# Patient Record
Sex: FEMALE | Race: WHITE | NOT HISPANIC OR LATINO | Employment: FULL TIME | URBAN - NONMETROPOLITAN AREA
[De-identification: names, ages, dates, MRNs, and addresses within clinical notes are randomized per-mention and may not be internally consistent; named-entity substitution may affect disease eponyms.]

---

## 2019-06-17 ENCOUNTER — OFFICE VISIT (OUTPATIENT)
Dept: PSYCHIATRY | Facility: CLINIC | Age: 44
End: 2019-06-17

## 2019-06-17 VITALS
BODY MASS INDEX: 42.88 KG/M2 | DIASTOLIC BLOOD PRESSURE: 80 MMHG | HEIGHT: 63 IN | HEART RATE: 78 BPM | WEIGHT: 242 LBS | OXYGEN SATURATION: 98 % | TEMPERATURE: 98.7 F | SYSTOLIC BLOOD PRESSURE: 122 MMHG

## 2019-06-17 DIAGNOSIS — F43.21 GRIEF: ICD-10-CM

## 2019-06-17 DIAGNOSIS — F33.3 MAJOR DEPRESSIVE DISORDER, RECURRENT EPISODE, SEVERE, WITH PSYCHOSIS (HCC): Primary | ICD-10-CM

## 2019-06-17 PROCEDURE — 90792 PSYCH DIAG EVAL W/MED SRVCS: CPT | Performed by: NURSE PRACTITIONER

## 2019-06-17 RX ORDER — BUPROPION HYDROCHLORIDE 75 MG/1
75 TABLET ORAL DAILY
Qty: 30 TABLET | Refills: 0 | Status: SHIPPED | OUTPATIENT
Start: 2019-06-17 | End: 2019-07-01 | Stop reason: SDUPTHER

## 2019-06-17 RX ORDER — PANTOPRAZOLE SODIUM 40 MG/1
40 TABLET, DELAYED RELEASE ORAL DAILY
Refills: 2 | COMMUNITY
Start: 2019-06-11 | End: 2020-09-23

## 2019-06-17 RX ORDER — BUSPIRONE HYDROCHLORIDE 15 MG/1
15 TABLET ORAL 2 TIMES DAILY
Refills: 2 | COMMUNITY
Start: 2019-06-11 | End: 2019-06-17 | Stop reason: SDUPTHER

## 2019-06-17 RX ORDER — PRAZOSIN HYDROCHLORIDE 1 MG/1
1 CAPSULE ORAL NIGHTLY
Qty: 30 CAPSULE | Refills: 0 | Status: SHIPPED | OUTPATIENT
Start: 2019-06-17 | End: 2019-07-15 | Stop reason: SDUPTHER

## 2019-06-17 RX ORDER — FLUOXETINE HYDROCHLORIDE 40 MG/1
CAPSULE ORAL
Refills: 2 | COMMUNITY
Start: 2019-06-11 | End: 2019-06-17 | Stop reason: SDUPTHER

## 2019-06-17 RX ORDER — VALACYCLOVIR HYDROCHLORIDE 500 MG/1
TABLET, FILM COATED ORAL
Refills: 3 | COMMUNITY
Start: 2019-03-22 | End: 2020-09-23 | Stop reason: SDUPTHER

## 2019-06-17 RX ORDER — FLUOXETINE HYDROCHLORIDE 40 MG/1
40 CAPSULE ORAL DAILY
Qty: 30 CAPSULE | Refills: 0 | Status: SHIPPED | OUTPATIENT
Start: 2019-06-17 | End: 2019-07-15 | Stop reason: SDUPTHER

## 2019-06-17 RX ORDER — HYDROCHLOROTHIAZIDE 25 MG/1
25 TABLET ORAL DAILY
Refills: 0 | COMMUNITY
Start: 2019-03-27 | End: 2020-09-23

## 2019-06-17 RX ORDER — IBUPROFEN 800 MG/1
800 TABLET ORAL 3 TIMES DAILY
Refills: 2 | COMMUNITY
Start: 2019-06-11 | End: 2021-01-20 | Stop reason: SDUPTHER

## 2019-06-17 RX ORDER — BUSPIRONE HYDROCHLORIDE 15 MG/1
15 TABLET ORAL 2 TIMES DAILY
Qty: 60 TABLET | Refills: 0 | Status: SHIPPED | OUTPATIENT
Start: 2019-06-17 | End: 2019-07-15

## 2019-06-17 NOTE — PROGRESS NOTES
"Subjective   Cynthia Kelsey is a 43 y.o. female who is here today for initial appointment to evaluate for medication options.     Chief Complaint: depression and anxiety    HPI:  History of Present Illness   Patient presents today with her  for initial evaluation for medication for depression and anxiety.  Patient states her depression started about 4 years ago when she was dealing with her son being an addict.  Patient states prior to the incident with her son she never had any problems with depression or anxiety.  Patient states her son has been 3 years clean now however 2 years ago she lost her mom to a drunk  and then a year later her brother passed away from alcoholism.  Patient states that she felt like she was being hit one thing right after the other and never got to fully grieve over any of it.  Patient states she is having a lot of depression and rates her depression at a 10 out of 10 on a 0-to-10 scale with 10 being the worst.  Patient states she do not want to leave the house, no energy, no motivation, emotionally eating, hopelessness, helplessness, and insomnia.  Patient states she is unable to sleep and is only getting maybe 2 to 3 hours asleep at night.  Patient states she is up every hour or so and tosses and turns all night long.  Patient states that she is dreaming a lot having multiple nightmares throughout the night then some nights just dreaming of \"stupid stuff \".  Patient states due to the stream she is unable to get any rest.  Patient states her anxiety is also high and rates it at a 9 out of 10 on a 0-to-10 scale with 10 being the worst.  Patient states she feels nervous, irritable, anxious, overwhelmed, and unable to get any rest.  Patient denies any panic attacks.  Patient states she is also been recently having hallucinations where she is seeing shadows in her room.  Patient states she never sees any distinct features however it is always just a shadow.  Patient states she " is also hearing things such as people saying her name.  Patient states that she will hear her name being called however there is no one around.  Patient states she feels like if she could get some sleep some of this would stop.  Patient states she has gained 40 pounds in the last year and is at her heaviest.  Patient states she used to love to exercise and workout but right now she has no energy.  Patient states she even has a gym in her home and is unable to make herself exercise.  Patient states she knows that if she began exercise and she would feel better however it is getting the motivation up to do it.  Patient states before everything happened she would routinely get up at 5 AM and go to the gym to work out before going to work however she has not been able to do this.  Patient states her appetite is increased and she feels like she could eat all the time.  Patient denies any marcos or hypomania.  Patient denies any flashbacks.  Patient adamantly denies any SI or HI.  Patient denies any self-harm.  Patient states that she does occasionally have the thoughts of what is the point.  Patient denies any side effects to current medication.  Patient states she has been on the medication for 2 to 3 months now.  Patient is currently taking Prozac 40 mg daily and BuSpar 15 mg twice a day.  Patient states she did notice a difference with Prozac when it was first started and notice a difference with the BuSpar as well.  Patient reports she is still currently off work however she is due to return next week and is unsure if she will be able to go in or not.  Patient states work is very stressful and even after she leaves the evening she still has to answer phone calls and emails.  Patient states she never feels like she gets a break from work.  Patient states even after the death of her mother and brother she only got a week off and do not feel like she got enough time to grieve.    Past Psych History:   Patient denies any  psychiatric admission.  Patient denies any suicide attempts in the past.  Patient denies any self-harm in the past.  Patient denies any abuse or trauma.  Patient denies any history of any mental illness prior to 4 years ago.    Previous Psych Meds:   Patient states the only medication she is ever tried is the Prozac and the BuSpar.  Patient states no side effects to either 1 of them.  Substance Abuse:    Patient states she was smoking cigarettes however she quit 3 months ago and will occasionally use the gum.  Patient states she has no desire craving at this time and does not use the gum often.  Patient denies any alcohol use current or in the past.  Patient denies any drug use current or in the past.  Patient states she will have caffeine throughout the day.  Patient states she drinks about 1 coffee a day and 1 pop a day.  Patient's  states that she does occasionally drink Thermovex for weight loss which also has caffeine in it.    Social History:   Patient states she grew up with her mother.  Patient states she did not know her father. Patient states he committed suicide when she was 9 months old.  Patient states she had one brother who passed away.  Patient states that she feels like she is an orphan as she has lost every member of her family.  Patient graduated high school and attended college in which she graduated with her bachelor's degree.  Patient is currently working at [employer] and has been for the past 5 years.  Patient states she is the  and her job is very stressful.  Patient states she comes home and still has to deal with emails and calls and does not feel like she gets to leave work.  Patient has been  to her  for 5 to 6 years and has a good relationship with him.  Patient states between the 2 of them they have 5 children.  Patient states 3 children are hers and 2 are he is.  Patient states good relationship with the children and her girls are actually always at her  side.  Patient states currently she has nothing that she enjoys her has time to do however she likes to float in her pool and exercise.  Patient states she would like to get to where she could enjoy this again.    Family Psychiatric History:  family history includes Alcohol abuse in her brother; Suicidality in her father.    Medical/Surgical History:  Past Medical History:   Diagnosis Date   • GERD (gastroesophageal reflux disease)      History reviewed. No pertinent surgical history.    No Known Allergies        Current Medications:   Current Outpatient Medications   Medication Sig Dispense Refill   • buPROPion (WELLBUTRIN) 75 MG tablet Take 1 tablet by mouth Daily. 30 tablet 0   • busPIRone (BUSPAR) 15 MG tablet Take 1 tablet by mouth 2 (Two) Times a Day. 60 tablet 0   • FLUoxetine (PROzac) 40 MG capsule Take 1 capsule by mouth Daily. 30 capsule 0   • hydrochlorothiazide (HYDRODIURIL) 25 MG tablet Take 25 mg by mouth Daily.  0   • ibuprofen (ADVIL,MOTRIN) 800 MG tablet Take 800 mg by mouth 3 (Three) Times a Day.  2   • pantoprazole (PROTONIX) 40 MG EC tablet Take 40 mg by mouth Daily.  2   • prazosin (MINIPRESS) 1 MG capsule Take 1 capsule by mouth Every Night. 30 capsule 0   • valACYclovir (VALTREX) 500 MG tablet   3     No current facility-administered medications for this visit.          Review of Systems   Constitutional: Positive for appetite change.   Respiratory: Negative.    Cardiovascular: Negative.    Psychiatric/Behavioral: Positive for dysphoric mood, hallucinations and sleep disturbance. The patient is nervous/anxious.     denies HEENT, cardiovascular, respiratory, liver, renal, GI/, endocrine, neuro, DERM, hematology, immunology, musculoskeletal disorders.    Objective   Physical Exam   Constitutional: Vital signs are normal. She appears well-developed and well-nourished. She is cooperative.   Neurological: She is alert.   Psychiatric: Her speech is normal. Judgment and thought content normal. Her  "mood appears anxious. She is agitated. Cognition and memory are normal. She exhibits a depressed mood.   Vitals reviewed.    Blood pressure 122/80, pulse 78, temperature 98.7 °F (37.1 °C), temperature source Temporal, height 160 cm (63\"), weight 110 kg (242 lb), SpO2 98 %. Body mass index is 42.87 kg/m².      Mental Status Exam:   Hygiene:   good  Cooperation:  Cooperative  Eye Contact:  Fair  Psychomotor Behavior:  Appropriate  Affect:  Appropriate  Hopelessness: 1  Speech:  Normal  Thought Process:  Goal directed and Linear  Thought Content:  Normal  Suicidal:  None  Homicidal:  None  Hallucinations:  Auditory and Visual   Delusion:  None  Memory:  Intact  Orientation:  Person, Place, Time and Situation  Reliability:  fair  Insight:  Fair  Judgement:  Fair  Impulse Control:  Fair  Physical/Medical Issues:  No       Short-term goals: Patient will be compliant with clinic appointments.  Patient will be engaged in therapy, medication compliant with minimal side effects. Patient  will report decrease of symptoms and frequency.    Long-term goals: Patient will have minimal symptoms of depression, anxiety, and insomnia with continued medication management. Patient will be compliant with treatment and appointments.       Problem list: anxiety, depression, and insomnia  Strengths:organization  Weaknesses: setting boundaries, tieing self worth to others    Assessment/Plan   Diagnoses and all orders for this visit:    Major depressive disorder, recurrent episode, severe, with psychosis (CMS/[facility])  -     busPIRone (BUSPAR) 15 MG tablet; Take 1 tablet by mouth 2 (Two) Times a Day.  -     FLUoxetine (PROzac) 40 MG capsule; Take 1 capsule by mouth Daily.  -     buPROPion (WELLBUTRIN) 75 MG tablet; Take 1 tablet by mouth Daily.    Grief  -     FLUoxetine (PROzac) 40 MG capsule; Take 1 capsule by mouth Daily.  -     buPROPion (WELLBUTRIN) 75 MG tablet; Take 1 tablet by mouth Daily.    Other orders  -     prazosin (MINIPRESS) 1 MG " capsule; Take 1 capsule by mouth Every Night.      Discussed medication options with patient.  Start prazosin 1 mg at bedtime and Wellbutrin 75 mg daily.  Continue BuSpar and Prozac.  Discussed with patient risks, benefits, and side effects of medications.  Patient acknowledged and verbally consented.  Discussed with patient healthy sleep hygiene and healthy diet.  Discussed with patient setting boundaries regarding when leaving work not answering emails or phone calls off duty.  Discussed with patient taking a 10 to 15 minutes a day for self-care and time for herself.  Discussed with patient if having any questions or concerns to call the office.  Discussed with patient during medication adjustments frequency of visits to achieve stabilization.  Discussed with patient if having any SI or HI to call 911 or go to the nearest ER.  Patient agreed to current treatment plan.  Follow up in two weeks.   Errors in dictation may reflect use of voice recognition software and not all errors in transcription may have been detected prior to signing.        This document has been electronically signed by FLAVIO Georges   June 18, 2019 11:29 AM

## 2019-07-01 ENCOUNTER — OFFICE VISIT (OUTPATIENT)
Dept: PSYCHIATRY | Facility: CLINIC | Age: 44
End: 2019-07-01

## 2019-07-01 VITALS
HEIGHT: 63 IN | SYSTOLIC BLOOD PRESSURE: 130 MMHG | DIASTOLIC BLOOD PRESSURE: 80 MMHG | WEIGHT: 246 LBS | OXYGEN SATURATION: 96 % | BODY MASS INDEX: 43.59 KG/M2 | TEMPERATURE: 98.3 F | HEART RATE: 84 BPM

## 2019-07-01 DIAGNOSIS — F33.3 MAJOR DEPRESSIVE DISORDER, RECURRENT EPISODE, SEVERE, WITH PSYCHOSIS (HCC): ICD-10-CM

## 2019-07-01 DIAGNOSIS — F43.21 GRIEF: ICD-10-CM

## 2019-07-01 DIAGNOSIS — F41.1 GENERALIZED ANXIETY DISORDER: Primary | ICD-10-CM

## 2019-07-01 PROCEDURE — 99213 OFFICE O/P EST LOW 20 MIN: CPT | Performed by: NURSE PRACTITIONER

## 2019-07-01 RX ORDER — CLONIDINE HYDROCHLORIDE 0.1 MG/1
0.1 TABLET ORAL NIGHTLY PRN
Qty: 14 TABLET | Refills: 14 | Status: SHIPPED | OUTPATIENT
Start: 2019-07-01 | End: 2019-07-15

## 2019-07-01 RX ORDER — FLUOXETINE HYDROCHLORIDE 20 MG/1
20 CAPSULE ORAL DAILY
Qty: 30 CAPSULE | Refills: 0 | Status: SHIPPED | OUTPATIENT
Start: 2019-07-01 | End: 2019-07-15 | Stop reason: SDUPTHER

## 2019-07-01 RX ORDER — BUPROPION HYDROCHLORIDE 75 MG/1
75 TABLET ORAL 2 TIMES DAILY
Qty: 60 TABLET | Refills: 0 | Status: SHIPPED | OUTPATIENT
Start: 2019-07-01 | End: 2019-07-15 | Stop reason: SDUPTHER

## 2019-07-01 NOTE — PROGRESS NOTES
Subjective   Cynthia Kelsey is a 43 y.o. female is here today for medication management follow-up.    Chief Complaint:  Depression, anxiety, and insomnia     History of Present Illness:   Patient presents today for follow-up for medication management with .  Patient states not a whole lot of difference but she has noticed a slight difference.  Patient states she is not having as much crying spells.  Patient states she still exhibits symptoms of depression of decreased energy, decreased motivation, insomnia, fatigue, and emotionally eating.  Patient states she is still wanting to stay home all the time.  Patient states not a huge difference in depression.  Patient states her dreams are not as vivid now since starting the prazosin.  Patient states she is actually able to sleep somewhat better.  Patient states she still has dreams and still able to remember some parts of them however she is not waking up in a panic.  Patient states she will go to sleep around 10 or 11 PM and does not get up until 6:30 AM however she will lay there at least 1 to 2 hours before being able to go to sleep.  Patient reports that her biggest trouble going to sleep is her anxiety when laying down.  Patient states she does go to her car during her lunch break at work and takes a 20 to 30-minute nap.  Patient states that this is the best sleep she gets.  Patient states she is having a hard time with getting in a car with someone.  Patient states she even does not trust her own driving.  Patient states getting in the car and trying to go somewhere will put her into a panic.  Patient denies any history of vehicle accidents but states her mother was killed by a drunk  and she is unsure if this is the reasoning behind the panic.  Patient denies any other panic attacks.  Patient states her anxiety is at the same level with no difference noted.  Patient states she still sees the shadows that are at the end of her bed or when she  "looks outside however now they are not talking to her anymore.  Patient denies any auditory hallucinations.  Patient denies any changes in her appetite.  Patient adamantly denies any SI or HI.  Patient states she has not had any SI this past week which is an improvement because she was having thoughts daily.  Patient states she has been able to make herself get out and go to her home gym or the pool occasionally through the week.  Patient states she does not stay there very long however this is an improvement from staying in the house.  Patient reports that she did return back to work and it is going the best it can.  Patient states that she has decided at the end of the year she will begin looking for different employment.  Patient denies any side effects to medications.  Patient denies any medical changes since last visit.    The following portions of the patient's history were reviewed and updated as appropriate: allergies, current medications, past family history, past medical history, past social history, past surgical history and problem list.    Review of Systems   Constitutional: Negative.    Respiratory: Negative.    Cardiovascular: Negative.    Psychiatric/Behavioral: Positive for dysphoric mood, hallucinations and sleep disturbance. The patient is nervous/anxious.        Objective   Physical Exam   Constitutional: Vital signs are normal. She appears well-developed and well-nourished. She is cooperative.   Neurological: She is alert.   Psychiatric: Her speech is normal and behavior is normal. Judgment and thought content normal. Her mood appears anxious. Cognition and memory are normal. She exhibits a depressed mood.   Vitals reviewed.    Blood pressure 130/80, pulse 84, temperature 98.3 °F (36.8 °C), temperature source Oral, height 160 cm (62.99\"), weight 112 kg (246 lb), SpO2 96 %. Body mass index is 43.59 kg/m².    No Known Allergies    Medication List:   Current Outpatient Medications   Medication Sig " Dispense Refill   • buPROPion (WELLBUTRIN) 75 MG tablet Take 1 tablet by mouth 2 (Two) Times a Day. 60 tablet 0   • busPIRone (BUSPAR) 15 MG tablet Take 1 tablet by mouth 2 (Two) Times a Day. 60 tablet 0   • FLUoxetine (PROzac) 40 MG capsule Take 1 capsule by mouth Daily. 30 capsule 0   • hydrochlorothiazide (HYDRODIURIL) 25 MG tablet Take 25 mg by mouth Daily.  0   • ibuprofen (ADVIL,MOTRIN) 800 MG tablet Take 800 mg by mouth 3 (Three) Times a Day.  2   • pantoprazole (PROTONIX) 40 MG EC tablet Take 40 mg by mouth Daily.  2   • prazosin (MINIPRESS) 1 MG capsule Take 1 capsule by mouth Every Night. 30 capsule 0   • valACYclovir (VALTREX) 500 MG tablet   3   • CloNIDine (CATAPRES) 0.1 MG tablet Take 1 tablet by mouth At Night As Needed (anxiety). 14 tablet 14   • FLUoxetine (PROZAC) 20 MG capsule Take 1 capsule by mouth Daily. Take with 40mg for a total dose 60mg 30 capsule 0     No current facility-administered medications for this visit.        Mental Status Exam:   Hygiene:   good  Cooperation:  Cooperative  Eye Contact:  Fair  Psychomotor Behavior:  Appropriate  Affect:  Appropriate  Hopelessness: Denies  Speech:  Normal  Thought Process:  Goal directed and Linear  Thought Content:  Normal  Suicidal:  None  Homicidal:  None  Hallucinations:  Visual  Delusion:  None  Memory:  Intact  Orientation:  Person, Place, Time and Situation  Reliability:  fair  Insight:  Fair  Judgement:  Fair  Impulse Control:  Fair  Physical/Medical Issues:  No         Assessment/Plan   Diagnoses and all orders for this visit:    Generalized anxiety disorder  -     CloNIDine (CATAPRES) 0.1 MG tablet; Take 1 tablet by mouth At Night As Needed (anxiety).  -     FLUoxetine (PROZAC) 20 MG capsule; Take 1 capsule by mouth Daily. Take with 40mg for a total dose 60mg    Major depressive disorder, recurrent episode, severe, with psychosis (CMS/HCC)  -     buPROPion (WELLBUTRIN) 75 MG tablet; Take 1 tablet by mouth 2 (Two) Times a Day.  -      FLUoxetine (PROZAC) 20 MG capsule; Take 1 capsule by mouth Daily. Take with 40mg for a total dose 60mg    Grief  -     buPROPion (WELLBUTRIN) 75 MG tablet; Take 1 tablet by mouth 2 (Two) Times a Day.        Discussed medication options with patient.  Start clonidine 0.1 at bedtime.  Increase Prozac and Wellbutrin.  Discussed with patient risk, benefits, and side effects of medications.  Patient and patient's  acknowledged and verbally consented.  Discussed with patient healthy sleep hygiene such as no screen time an hour before bed including TV, cell phone, and tablet.  Discussed with patient if unable to sleep in 10 to 15 minutes after laying down to get up and read or walk until feeling tired.  Discussed with patient healthy eating habits.  Discussed with patient if having any questions or concerns to call the office.  Discussed with patient having any SI or HI to call 911 or go to the nearest ER.  Patient and patient's  agreed to current treatment plan.    Follow up in two weeks.      Errors in dictation may reflect use of voice recognition software and not all errors in transcription may have been detected prior to signing.            This document has been electronically signed by FLAVIO Georges   July 1, 2019 2:47 PM

## 2019-07-15 ENCOUNTER — OFFICE VISIT (OUTPATIENT)
Dept: PSYCHIATRY | Facility: CLINIC | Age: 44
End: 2019-07-15

## 2019-07-15 VITALS
TEMPERATURE: 98.6 F | HEART RATE: 86 BPM | SYSTOLIC BLOOD PRESSURE: 122 MMHG | WEIGHT: 243 LBS | BODY MASS INDEX: 43.05 KG/M2 | OXYGEN SATURATION: 97 % | DIASTOLIC BLOOD PRESSURE: 82 MMHG | HEIGHT: 63 IN

## 2019-07-15 DIAGNOSIS — F43.21 GRIEF: ICD-10-CM

## 2019-07-15 DIAGNOSIS — F33.3 MAJOR DEPRESSIVE DISORDER, RECURRENT EPISODE, SEVERE, WITH PSYCHOSIS (HCC): ICD-10-CM

## 2019-07-15 DIAGNOSIS — F41.1 GENERALIZED ANXIETY DISORDER: ICD-10-CM

## 2019-07-15 PROCEDURE — 99213 OFFICE O/P EST LOW 20 MIN: CPT | Performed by: NURSE PRACTITIONER

## 2019-07-15 RX ORDER — FLUOXETINE HYDROCHLORIDE 40 MG/1
40 CAPSULE ORAL DAILY
Qty: 30 CAPSULE | Refills: 0 | Status: SHIPPED | OUTPATIENT
Start: 2019-07-15 | End: 2019-07-30 | Stop reason: SDUPTHER

## 2019-07-15 RX ORDER — PRAZOSIN HYDROCHLORIDE 1 MG/1
2 CAPSULE ORAL NIGHTLY
Qty: 60 CAPSULE | Refills: 0 | Status: SHIPPED | OUTPATIENT
Start: 2019-07-15 | End: 2019-07-19

## 2019-07-15 RX ORDER — BUPROPION HYDROCHLORIDE 100 MG/1
100 TABLET ORAL 2 TIMES DAILY
Qty: 60 TABLET | Refills: 0 | Status: SHIPPED | OUTPATIENT
Start: 2019-07-15 | End: 2019-07-30 | Stop reason: SDUPTHER

## 2019-07-15 RX ORDER — FLUOXETINE HYDROCHLORIDE 20 MG/1
20 CAPSULE ORAL DAILY
Qty: 30 CAPSULE | Refills: 0 | Status: SHIPPED | OUTPATIENT
Start: 2019-07-15 | End: 2019-07-30 | Stop reason: SDUPTHER

## 2019-07-15 NOTE — PROGRESS NOTES
Subjective   Cynthia Kelsey is a 43 y.o. female is here today for medication management follow-up.    Chief Complaint:  Depression and anxiety     History of Present Illness:    Patient presents today with  for a follow up for medication management for depression and anxiety.  Patient reports that she is doing somewhat better and has more energy.  Patient states that she feels like there is been a slight improvement with depression and rates it at a 8 on 0-10 scale with 10 being the worse.  Patient that she still has symptoms of depression of no energy, no motivation, slight fatigue, and sad mood.  Patient that she is no longer having the frequent suicidal thoughts.  Patient denies any SI or HI currently.  Patient reports that she is actually had a lot of social interaction this week and was able to be social without any panic or anxiety attacks.  Patient reports her anxiety is still there and rates it at a 6 or 7 on 0-to-10 scale with 10 being worse.  Patient denies any panic attacks.  Patient states she tried clonidine at night however it was ineffective so she stopped it and begin taking the prazosin again.  Patient reports with taking the prazosin she still has nightmares but are not as vivid as before and not waking her up as much through the night.  Patient reports getting about 6 hours asleep at night.  Patient states last week there was actually a couple days where she did not hide in her car and nap on her lunch break.  Patient adamantly denies any SI or HI.  Patient denies any auditory hallucinations but states she still seeing shadows occasionally throughout the day.  Patient denies any side effects to medications.  Patient denies any medical changes since last visit.  Patient denies any changes in appetite since last visit.    The following portions of the patient's history were reviewed and updated as appropriate: allergies, current medications, past family history, past medical history,  "past social history, past surgical history and problem list.    Review of Systems   Constitutional: Negative.    Respiratory: Negative.    Cardiovascular: Negative.    Psychiatric/Behavioral: Positive for dysphoric mood, hallucinations and sleep disturbance. The patient is nervous/anxious.        Objective   Physical Exam   Constitutional: Vital signs are normal. She appears well-developed and well-nourished. She is cooperative.   Neurological: She is alert.   Psychiatric: Her speech is normal. Judgment and thought content normal. She is agitated. Cognition and memory are normal. She exhibits a depressed mood.   Vitals reviewed.    Blood pressure 122/82, pulse 86, temperature 98.6 °F (37 °C), temperature source Oral, height 160 cm (62.99\"), weight 110 kg (243 lb), SpO2 97 %. Body mass index is 43.06 kg/m².    No Known Allergies    Medication List:   Current Outpatient Medications   Medication Sig Dispense Refill   • buPROPion (WELLBUTRIN) 100 MG tablet Take 1 tablet by mouth 2 (Two) Times a Day. 60 tablet 0   • FLUoxetine (PROZAC) 20 MG capsule Take 1 capsule by mouth Daily. Take with 40mg for a total dose 60mg 30 capsule 0   • FLUoxetine (PROzac) 40 MG capsule Take 1 capsule by mouth Daily. 30 capsule 0   • hydrochlorothiazide (HYDRODIURIL) 25 MG tablet Take 25 mg by mouth Daily.  0   • ibuprofen (ADVIL,MOTRIN) 800 MG tablet Take 800 mg by mouth 3 (Three) Times a Day.  2   • pantoprazole (PROTONIX) 40 MG EC tablet Take 40 mg by mouth Daily.  2   • prazosin (MINIPRESS) 1 MG capsule Take 2 capsules by mouth Every Night. 60 capsule 0   • valACYclovir (VALTREX) 500 MG tablet   3     No current facility-administered medications for this visit.        Mental Status Exam:   Hygiene:   good  Cooperation:  Cooperative  Eye Contact:  Fair  Psychomotor Behavior:  Appropriate  Affect:  Appropriate  Hopelessness: Denies  Speech:  Normal and Minimal  Thought Process:  Goal directed and Linear  Thought Content:  Normal  Suicidal: "  None  Homicidal:  None  Hallucinations:  Not demonstrated today   Delusion:  None  Memory:  Intact  Orientation:  Person, Place, Time and Situation  Reliability:  fair  Insight:  Fair  Judgement:  Fair  Impulse Control:  Fair  Physical/Medical Issues:  No           Assessment/Plan   Diagnoses and all orders for this visit:    Major depressive disorder, recurrent episode, severe, with psychosis (CMS/HCC)  -     buPROPion (WELLBUTRIN) 100 MG tablet; Take 1 tablet by mouth 2 (Two) Times a Day.  -     FLUoxetine (PROZAC) 20 MG capsule; Take 1 capsule by mouth Daily. Take with 40mg for a total dose 60mg  -     FLUoxetine (PROzac) 40 MG capsule; Take 1 capsule by mouth Daily.  -     prazosin (MINIPRESS) 1 MG capsule; Take 2 capsules by mouth Every Night.    Grief  -     buPROPion (WELLBUTRIN) 100 MG tablet; Take 1 tablet by mouth 2 (Two) Times a Day.  -     FLUoxetine (PROzac) 40 MG capsule; Take 1 capsule by mouth Daily.  -     prazosin (MINIPRESS) 1 MG capsule; Take 2 capsules by mouth Every Night.    Generalized anxiety disorder  -     FLUoxetine (PROZAC) 20 MG capsule; Take 1 capsule by mouth Daily. Take with 40mg for a total dose 60mg        Discussed medication options.  Increase Wellbutrin 200 mg and prazosin 2 mg.  Continue Prozac.  Reviewed the risks, benefits, and side effects of the medications; patient acknowledged and verbally consented.  Discussed with patient healthy sleep hygiene and healthy eating habits.  Discussed with patient weaning off medication when patient is ready and appropriate.  Patient reports she does want to become stable with her mood and anxiety but would like to come off of medication in the future.  Patient is agreeable to call the office if having any questions or concerns.  Patient is aware to call 911 or go to the nearest ER should begin having SI/HI.  Patient agreeable to current plan.    Follow up in four weeks.      Errors in dictation may reflect use of voice recognition  software and not all errors in transcription may have been detected prior to signing.            This document has been electronically signed by FLAVIO Georges   July 15, 2019 11:23 AM

## 2019-07-19 DIAGNOSIS — F43.21 GRIEF: ICD-10-CM

## 2019-07-19 DIAGNOSIS — F33.3 MAJOR DEPRESSIVE DISORDER, RECURRENT EPISODE, SEVERE, WITH PSYCHOSIS (HCC): Primary | ICD-10-CM

## 2019-07-19 DIAGNOSIS — F41.1 GENERALIZED ANXIETY DISORDER: ICD-10-CM

## 2019-07-19 RX ORDER — PRAZOSIN HYDROCHLORIDE 5 MG/1
5 CAPSULE ORAL NIGHTLY
Qty: 30 CAPSULE | Refills: 0 | Status: SHIPPED | OUTPATIENT
Start: 2019-07-19 | End: 2019-07-30 | Stop reason: SDUPTHER

## 2019-07-30 DIAGNOSIS — F41.1 GENERALIZED ANXIETY DISORDER: ICD-10-CM

## 2019-07-30 DIAGNOSIS — F43.21 GRIEF: ICD-10-CM

## 2019-07-30 DIAGNOSIS — F33.3 MAJOR DEPRESSIVE DISORDER, RECURRENT EPISODE, SEVERE, WITH PSYCHOSIS (HCC): ICD-10-CM

## 2019-07-30 RX ORDER — FLUOXETINE HYDROCHLORIDE 20 MG/1
20 CAPSULE ORAL DAILY
Qty: 30 CAPSULE | Refills: 0 | Status: SHIPPED | OUTPATIENT
Start: 2019-07-30 | End: 2019-08-18 | Stop reason: SDUPTHER

## 2019-07-30 RX ORDER — BUPROPION HYDROCHLORIDE 100 MG/1
100 TABLET ORAL 2 TIMES DAILY
Qty: 60 TABLET | Refills: 0 | Status: SHIPPED | OUTPATIENT
Start: 2019-07-30 | End: 2019-08-18 | Stop reason: SDUPTHER

## 2019-07-30 RX ORDER — PRAZOSIN HYDROCHLORIDE 5 MG/1
5 CAPSULE ORAL NIGHTLY
Qty: 30 CAPSULE | Refills: 0 | Status: SHIPPED | OUTPATIENT
Start: 2019-07-30 | End: 2019-08-20 | Stop reason: SDUPTHER

## 2019-07-30 RX ORDER — FLUOXETINE HYDROCHLORIDE 40 MG/1
40 CAPSULE ORAL DAILY
Qty: 30 CAPSULE | Refills: 0 | Status: SHIPPED | OUTPATIENT
Start: 2019-07-30 | End: 2019-08-18 | Stop reason: SDUPTHER

## 2019-08-18 DIAGNOSIS — F43.21 GRIEF: ICD-10-CM

## 2019-08-18 DIAGNOSIS — F33.3 MAJOR DEPRESSIVE DISORDER, RECURRENT EPISODE, SEVERE, WITH PSYCHOSIS (HCC): ICD-10-CM

## 2019-08-18 DIAGNOSIS — F41.1 GENERALIZED ANXIETY DISORDER: ICD-10-CM

## 2019-08-19 RX ORDER — FLUOXETINE HYDROCHLORIDE 20 MG/1
CAPSULE ORAL
Qty: 30 CAPSULE | Refills: 0 | Status: SHIPPED | OUTPATIENT
Start: 2019-08-19 | End: 2019-08-20 | Stop reason: DRUGHIGH

## 2019-08-19 RX ORDER — BUPROPION HYDROCHLORIDE 100 MG/1
TABLET ORAL
Qty: 60 TABLET | Refills: 0 | Status: SHIPPED | OUTPATIENT
Start: 2019-08-19 | End: 2019-08-20

## 2019-08-19 RX ORDER — FLUOXETINE HYDROCHLORIDE 40 MG/1
CAPSULE ORAL
Qty: 30 CAPSULE | Refills: 0 | Status: SHIPPED | OUTPATIENT
Start: 2019-08-19 | End: 2019-08-20 | Stop reason: SDUPTHER

## 2019-08-20 ENCOUNTER — OFFICE VISIT (OUTPATIENT)
Dept: PSYCHIATRY | Facility: CLINIC | Age: 44
End: 2019-08-20

## 2019-08-20 VITALS
HEIGHT: 63 IN | DIASTOLIC BLOOD PRESSURE: 78 MMHG | BODY MASS INDEX: 43.87 KG/M2 | HEART RATE: 74 BPM | WEIGHT: 247.6 LBS | SYSTOLIC BLOOD PRESSURE: 134 MMHG

## 2019-08-20 DIAGNOSIS — F43.21 GRIEF: ICD-10-CM

## 2019-08-20 DIAGNOSIS — F33.3 MAJOR DEPRESSIVE DISORDER, RECURRENT EPISODE, SEVERE, WITH PSYCHOSIS (HCC): Primary | ICD-10-CM

## 2019-08-20 DIAGNOSIS — F41.0 PANIC DISORDER WITHOUT AGORAPHOBIA: ICD-10-CM

## 2019-08-20 PROCEDURE — 99214 OFFICE O/P EST MOD 30 MIN: CPT | Performed by: NURSE PRACTITIONER

## 2019-08-20 RX ORDER — FLUOXETINE HYDROCHLORIDE 40 MG/1
80 CAPSULE ORAL DAILY
Qty: 60 CAPSULE | Refills: 0 | Status: SHIPPED | OUTPATIENT
Start: 2019-08-20 | End: 2019-08-27 | Stop reason: SDUPTHER

## 2019-08-20 RX ORDER — PRAZOSIN HYDROCHLORIDE 5 MG/1
5 CAPSULE ORAL NIGHTLY
Qty: 30 CAPSULE | Refills: 0 | Status: SHIPPED | OUTPATIENT
Start: 2019-08-20 | End: 2019-08-27 | Stop reason: SDUPTHER

## 2019-08-20 RX ORDER — DIAZEPAM 2 MG/1
2 TABLET ORAL DAILY PRN
Qty: 10 TABLET | Refills: 0 | Status: SHIPPED | OUTPATIENT
Start: 2019-08-20 | End: 2019-09-20 | Stop reason: SDUPTHER

## 2019-08-20 RX ORDER — ARIPIPRAZOLE 2 MG/1
2 TABLET ORAL DAILY
Qty: 30 TABLET | Refills: 0 | Status: SHIPPED | OUTPATIENT
Start: 2019-08-20 | End: 2019-08-27 | Stop reason: SDUPTHER

## 2019-08-20 NOTE — PROGRESS NOTES
"      Subjective   Cynthia Kelsey is a 43 y.o. female is here today for medication management follow-up.    Chief Complaint:  Depression and anxiety     History of Present Illness:    Patient presents today with  for follow-up for medication management.  Patient states she has been doing \"okay\".  Patient states that she has not had any BuSpar since last visit and has been able to successfully wean off.  Patient reports she has not been at her own building at work in the past 3 weeks.  Patient states she was on vacation 1 week and went to the [facility].  Patient states she went with her girls and her  and they had a great time.  Patient states the other times she has been traveling to other offices so it has been sort of a mini break from her current office.  Has been reports there was an incident a couple of weeks ago in which the patient found out situation regarding her stepfather and her brother's fiancé.   reports since the passing of the patient's mother her stepfather and her brother's fiancé are now in a relationship and living in her mother's old home.   reports nobody had told the patient due to her current mental state.  Patient states she went to visit her younger brothers and her stepdad and found out the situation.  Patient states she got very upset and was very angry.  Patient states \"she wanted to hurt them\".  Patient states she did go home and got her 's pistol and was going to their house however when she went outside in the rain she stopped and did not go.  Patient states she has not had any feelings or desire to hurt him now.  Patient states finding this out was \"a slap in the face and betrayal of her mother \".   states he found out about it when he got home and immediately locked up guns and ammo as well as medication.   states there is only one key to the gun safe and he has it on him at all times.  Patient states now she is trying to avoid them " "and does not want to talk to her tarad or her brother's fiancé.  Patient reports telma has tried to contact her multiple times through phone call and facebook messages however she does ignore them.  Patient states her depression is at a 9 on a 0-to-10 scale with 10 being the worst.  Patient states she feels like she has no control over her life.  Patient states she is having difficulty because her family believes she should just \"get over it \".  Patient still has complaints of depression of depressed mood, no energy, no motivation, and irritability.  Patient states her anxiety has been a 9 on a 0-to-10 scale with 10 being the worst.  Patient complains of anxiety of feeling overwhelmed and on edge.  Patient reports having 3-4 panic attacks a month.  Patient states these last a few minutes.  Patient states she has not seen any of the shadows recently but is still hearing the noises.  Patient denies any command hallucinations.  Patient denies any visual hallucinations.  Patient states she is only getting about an hour of sleep at night.  Patient states she still having dreams however they are duller and not as severe as before medication.  Patient reports some of the dreams are good dreams.  Patient adamantly denies any SI or HI.  Discussed going to the hospital with patient.  Patient states she does not feel like she needs to go the hospital.  Patient adamantly denies any thoughts, plan, or intent to harm anyone else.  Patient states she only had the thought to hurt her tarad and the fiance the one time.  Patient states she is still taking her medication.  Patient denies any side effects to medication.  The following portions of the patient's history were reviewed and updated as appropriate: allergies, current medications, past family history, past medical history, past social history, past surgical history and problem list.    Review of Systems   Constitutional: Negative.    Respiratory: Negative.  " "  Cardiovascular: Negative.    Psychiatric/Behavioral: Positive for agitation, dysphoric mood, hallucinations and sleep disturbance. The patient is nervous/anxious.        Objective   Physical Exam   Constitutional: Vital signs are normal. She appears well-developed and well-nourished. She is cooperative.   Neurological: She is alert.   Psychiatric: Her speech is normal. Judgment and thought content normal. Her mood appears anxious. Her affect is angry. She is agitated. Cognition and memory are normal.   Vitals reviewed.    Blood pressure 134/78, pulse 74, height 160 cm (62.99\"), weight 112 kg (247 lb 9.6 oz). Body mass index is 43.87 kg/m².    No Known Allergies    Medication List:   Current Outpatient Medications   Medication Sig Dispense Refill   • ARIPiprazole (ABILIFY) 2 MG tablet Take 1 tablet by mouth Daily. 30 tablet 0   • diazePAM (VALIUM) 2 MG tablet Take 1 tablet by mouth Daily As Needed for Anxiety. 10 tablet 0   • FLUoxetine (PROzac) 40 MG capsule Take 2 capsules by mouth Daily. 60 capsule 0   • hydrochlorothiazide (HYDRODIURIL) 25 MG tablet Take 25 mg by mouth Daily.  0   • ibuprofen (ADVIL,MOTRIN) 800 MG tablet Take 800 mg by mouth 3 (Three) Times a Day.  2   • pantoprazole (PROTONIX) 40 MG EC tablet Take 40 mg by mouth Daily.  2   • prazosin (MINIPRESS) 5 MG capsule Take 1 capsule by mouth Every Night. 30 capsule 0   • valACYclovir (VALTREX) 500 MG tablet   3     No current facility-administered medications for this visit.        Mental Status Exam:   Hygiene:   good  Cooperation:  Cooperative  Eye Contact:  Fair  Psychomotor Behavior:  Aggitated  Affect:  Appropriate  Hopelessness: Denies  Speech:  Normal  Thought Process:  Goal directed and Linear  Thought Content:  Mood congurent  Suicidal:  None  Homicidal:  None  Hallucinations:  Not demonstrated today   Delusion:  None  Memory:  Intact  Orientation:  Person, Place, Time and Situation  Reliability:  fair  Insight:  Fair  Judgement:  " Fair  Impulse Control:  Fair  Physical/Medical Issues:  No                Assessment/Plan   Diagnoses and all orders for this visit:    Major depressive disorder, recurrent episode, severe, with psychosis (CMS/[facility])  -     FLUoxetine (PROzac) 40 MG capsule; Take 2 capsules by mouth Daily.  -     prazosin (MINIPRESS) 5 MG capsule; Take 1 capsule by mouth Every Night.  -     ARIPiprazole (ABILIFY) 2 MG tablet; Take 1 tablet by mouth Daily.    Grief  -     FLUoxetine (PROzac) 40 MG capsule; Take 2 capsules by mouth Daily.  -     prazosin (MINIPRESS) 5 MG capsule; Take 1 capsule by mouth Every Night.  -     ARIPiprazole (ABILIFY) 2 MG tablet; Take 1 tablet by mouth Daily.    Other orders  -     diazePAM (VALIUM) 2 MG tablet; Take 1 tablet by mouth Daily As Needed for Anxiety.        Discussed medication options with patient.  Increase Prozac to 80 mg.  Decrease Wellbutrin to once a day for 1 week then discontinue.  Start Abilify 2 mg.  Start Valium 2 mg 1 daily as needed.  Continue prazosin 5 mg.  Discussed with patient and  risk, benefits, and side effects of medication.  Patient and   acknowledged and verbally consent.  Connor report reviewed. Patient is being prescribed a controlled substance as part of treatment plan. Patient has been educated of appropriate use of the medications, including risk of somnolence, limited ability to drive and/or work safely, and potential for dependence, respiratory depression and overdose. Patient is also informed that the medication are to be used by the patient only- avoid any combined use of ETOH or other substances unless prescribed.  Discussed with  [facility]   states he is locked up all firearms and ammo and there is only one key to the safe in which he keeps on him at all times.    states he is also locking up her medication as well in the "[employer] safe and gives her the medication when needed.  Discussed  with patient and  if having any questions, concerns, or worsening of symptoms to call the office.  Discussed with patient if having any SI or HI to call 911 or go to the nearest ER.  Discussed with patient going into [facility] for admission or another hospital.  Patient states she does not want to go into the hospital.    Discussed with Vania  of behavioral health clinic regarding situation and risk. Advised to follow up weekly with patient to monitor during medication adjustment. Discussed with Patient and  if at any time, she begins having any HI to go to the nearest ER or call 911. Patient and  acknowledged and agreed.        Follow up in one week.     Errors in dictation may reflect use of voice recognition software and not all errors in transcription may have been detected prior to signing.              This document has been electronically signed by FLAVIO Georges   August 21, 2019 9:30 AM

## 2019-08-27 ENCOUNTER — OFFICE VISIT (OUTPATIENT)
Dept: PSYCHIATRY | Facility: CLINIC | Age: 44
End: 2019-08-27

## 2019-08-27 VITALS
BODY MASS INDEX: 43.59 KG/M2 | SYSTOLIC BLOOD PRESSURE: 138 MMHG | DIASTOLIC BLOOD PRESSURE: 70 MMHG | WEIGHT: 246 LBS | HEART RATE: 78 BPM

## 2019-08-27 DIAGNOSIS — F33.3 MAJOR DEPRESSIVE DISORDER, RECURRENT EPISODE, SEVERE, WITH PSYCHOSIS (HCC): ICD-10-CM

## 2019-08-27 DIAGNOSIS — F43.21 GRIEF: ICD-10-CM

## 2019-08-27 PROCEDURE — 99213 OFFICE O/P EST LOW 20 MIN: CPT | Performed by: NURSE PRACTITIONER

## 2019-08-27 RX ORDER — ARIPIPRAZOLE 2 MG/1
2 TABLET ORAL DAILY
Qty: 30 TABLET | Refills: 0
Start: 2019-08-27 | End: 2019-09-06 | Stop reason: SDUPTHER

## 2019-08-27 RX ORDER — PRAZOSIN HYDROCHLORIDE 5 MG/1
5 CAPSULE ORAL NIGHTLY
Qty: 30 CAPSULE | Refills: 0 | Status: SHIPPED | OUTPATIENT
Start: 2019-08-27 | End: 2019-09-20 | Stop reason: SINTOL

## 2019-08-27 RX ORDER — FLUOXETINE HYDROCHLORIDE 40 MG/1
80 CAPSULE ORAL DAILY
Qty: 60 CAPSULE | Refills: 0
Start: 2019-08-27 | End: 2019-09-20 | Stop reason: SDUPTHER

## 2019-08-27 NOTE — TREATMENT PLAN
Multi-Disciplinary Problems (from Behavioral Health Treatment Plan)    Active Problems     Problem: Assessment/EAP  Start Date: 08/27/19    Problem Details:  The patient self-scales this problem as a 9 with 10 being the worst.       Goal Priority Start Date Expected End Date End Date    Patient will utilize appropriate treatment services. -- 08/27/19 -- --    Goal Details:  Progress toward goal:  Not appropriate to rate progress toward goal since this is the initial treatment plan.         Goal Intervention Frequency Start Date End Date    Assist patient in developing a plan of action Weekly 08/27/19 --    Intervention Details:  Duration of treatment until until remission of symptoms.             Problem: Anxiety  Start Date: 08/27/19    Problem Details:  The patient self-scales this problem as a 9 with 10 being the worst.       Goal Priority Start Date Expected End Date End Date    Patient will develop and implement behavioral and cognitive strategies to reduce anxiety and irrational fears. -- 08/27/19 -- --    Goal Details:  Progress toward goal:  Not appropriate to rate progress toward goal since this is the initial treatment plan.       Goal Intervention Frequency Start Date End Date    Help patient explore past emotional issues in relation to present anxiety. Weekly 08/27/19 --    Intervention Details:  Duration of treatment until until remission of symptoms.       Goal Intervention Frequency Start Date End Date    Help patient develop an awareness of their cognitive and physical responses to anxiety. Weekly 08/27/19 --    Intervention Details:  Duration of treatment until until remission of symptoms.             Problem: Depression  Start Date: 08/27/19    Problem Details:  The patient self-scales this problem as a 9 with 10 being the worst.       Goal Priority Start Date Expected End Date End Date    Patient will demonstrate the ability to initiate new constructive life skills outside of sessions on a  consistent basis. -- 08/27/19 -- --    Goal Details:  Progress toward goal:  Not appropriate to rate progress toward goal since this is the initial treatment plan.       Goal Intervention Frequency Start Date End Date    Assist patient in setting attainable activities of daily living goals. PRN 08/27/19 --    Goal Intervention Frequency Start Date End Date    Provide education about depression Weekly 08/27/19 --    Intervention Details:  Duration of treatment until until remission of symptoms.       Goal Intervention Frequency Start Date End Date    Assist patient in developing healthy coping strategies. Weekly 08/27/19 --    Intervention Details:  Duration of treatment until until remission of symptoms.                          I have discussed and reviewed this treatment plan with the patient.  It has been printed for signatures.

## 2019-08-27 NOTE — PROGRESS NOTES
"      Subjective   Cynthia Kelsey is a 43 y.o. female is here today for medication management follow-up.    Chief Complaint:  Depression and anxiety     History of Present Illness:    Patient presents today with  for follow-up for medication management.  Patient states she is doing a lot better this week and although her anxiety is still very high she does feel some better.  Patient states she did begin taking the Abilify and has not noticed any big side effects.  Patient states she has noticed some nausea at certain times during the day however it goes away after few minutes.  Patient states she has noticed since started taking the Abilify that she is able to go to sleep easier.  Patient states when she lays down she is able to go straight to sleep now.  Patient states she is getting a few hours of sleep now.  Patient states she still having dreams frequently however they are not as vivid and she is not waking up in the middle of the night in a panic.  Patient reports she did begin exercising with her daughter again and has lost 1 pound so far.  Patient states this has made her feel somewhat better.  Patient states her anxiety is still high and rates it about the same as last visit.  Patient states her depression is about the same as it was last visit.  Patient states she still has the same feelings of no energy, no motivation, and not wanting to get up and do anything.  Patient states however she is fighting through it.  Patient states no changes with appetite.  Patient states she still having the hallucinations however not seeing things mainly just hearing and feeling something there.  Patient states she will have the feeling that something is there and she can hear it say her name however it never says anything else.  Patient reports only having one \"crazy spell \"this past week in which she was just arguing and fighting with her daughter over nonsense stuff.  Patient states she was able to come off of " the Wellbutrin and has had no side effects from that either.  Patient adamantly denies any SI or HI.  Patient denies any medical changes since last visit.   reports he did put the Valium in the gun safe and he has the camara.   reports if patient needs the medication he will give it to her.  Patient states she has not felt like she has needed it any this week and has not taken any.   The following portions of the patient's history were reviewed and updated as appropriate: allergies, current medications, past family history, past medical history, past social history, past surgical history and problem list.    Review of Systems   Constitutional: Negative.    Respiratory: Negative.    Cardiovascular: Negative.    Gastrointestinal: Positive for nausea.   Psychiatric/Behavioral: Positive for agitation, dysphoric mood, hallucinations and sleep disturbance. The patient is nervous/anxious.        Objective   Physical Exam   Constitutional: Vital signs are normal. She appears well-developed and well-nourished. She is cooperative.   Neurological: She is alert.   Psychiatric: Her speech is normal and behavior is normal. Judgment and thought content normal. Her mood appears anxious. Cognition and memory are normal. She exhibits a depressed mood.   Vitals reviewed.    Blood pressure 138/70, pulse 78, weight 112 kg (246 lb). Body mass index is 43.59 kg/m².    No Known Allergies    Medication List:   Current Outpatient Medications   Medication Sig Dispense Refill   • ARIPiprazole (ABILIFY) 2 MG tablet Take 1 tablet by mouth Daily. 30 tablet 0   • diazePAM (VALIUM) 2 MG tablet Take 1 tablet by mouth Daily As Needed for Anxiety. 10 tablet 0   • FLUoxetine (PROzac) 40 MG capsule Take 2 capsules by mouth Daily. 60 capsule 0   • hydrochlorothiazide (HYDRODIURIL) 25 MG tablet Take 25 mg by mouth Daily.  0   • ibuprofen (ADVIL,MOTRIN) 800 MG tablet Take 800 mg by mouth 3 (Three) Times a Day.  2   • pantoprazole (PROTONIX) 40  MG EC tablet Take 40 mg by mouth Daily.  2   • prazosin (MINIPRESS) 5 MG capsule Take 1 capsule by mouth Every Night. 30 capsule 0   • valACYclovir (VALTREX) 500 MG tablet   3     No current facility-administered medications for this visit.        Mental Status Exam:   Hygiene:   good  Cooperation:  Cooperative  Eye Contact:  Good  Psychomotor Behavior:  Appropriate  Affect:  Appropriate  Hopelessness: Denies  Speech:  Normal  Thought Process:  Goal directed and Linear  Thought Content:  Normal  Suicidal:  None  Homicidal:  None  Hallucinations:  None  Delusion:  None  Memory:  Intact  Orientation:  Person, Place, Time and Situation  Reliability:  fair  Insight:  Fair  Judgement:  Fair  Impulse Control:  Fair  Physical/Medical Issues:  No            Assessment/Plan   Diagnoses and all orders for this visit:    Major depressive disorder, recurrent episode, severe, with psychosis (CMS/HCC)  -     ARIPiprazole (ABILIFY) 2 MG tablet; Take 1 tablet by mouth Daily.  -     FLUoxetine (PROzac) 40 MG capsule; Take 2 capsules by mouth Daily.  -     prazosin (MINIPRESS) 5 MG capsule; Take 1 capsule by mouth Every Night.    Grief  -     ARIPiprazole (ABILIFY) 2 MG tablet; Take 1 tablet by mouth Daily.  -     FLUoxetine (PROzac) 40 MG capsule; Take 2 capsules by mouth Daily.  -     prazosin (MINIPRESS) 5 MG capsule; Take 1 capsule by mouth Every Night.        Discussed medication options.  Continue Abilify, Prozac, and prazosin.  Discussed with patient risk, benefits, and side effects medications.  Patient acknowledged and verbally consented.  Discussed with patient healthy sleep hygiene and setting up at nighttime routine.  Discussed with patient if having any questions, concerns, or worsening of symptoms to call the office.  Discussed with  continuing to keep all firearms as well as medications and gun safe.  Discussed with patient if having any SI or HI to call 911 or go to the nearest ER.  Patient agreeable to current  plan.         Follow up in one week.     Errors in dictation may reflect use of voice recognition software and not all errors in transcription may have been detected prior to signing.            This document has been electronically signed by FLAVIO Georges   August 27, 2019 8:52 AM

## 2019-09-06 DIAGNOSIS — F33.3 MAJOR DEPRESSIVE DISORDER, RECURRENT EPISODE, SEVERE, WITH PSYCHOSIS (HCC): ICD-10-CM

## 2019-09-06 DIAGNOSIS — F43.21 GRIEF: ICD-10-CM

## 2019-09-06 RX ORDER — ARIPIPRAZOLE 5 MG/1
5 TABLET ORAL DAILY
Qty: 30 TABLET | Refills: 0 | Status: SHIPPED | OUTPATIENT
Start: 2019-09-06 | End: 2019-09-20 | Stop reason: SDUPTHER

## 2019-09-20 ENCOUNTER — OFFICE VISIT (OUTPATIENT)
Dept: PSYCHIATRY | Facility: CLINIC | Age: 44
End: 2019-09-20

## 2019-09-20 VITALS
DIASTOLIC BLOOD PRESSURE: 82 MMHG | WEIGHT: 252.4 LBS | SYSTOLIC BLOOD PRESSURE: 138 MMHG | HEART RATE: 87 BPM | BODY MASS INDEX: 44.72 KG/M2

## 2019-09-20 DIAGNOSIS — F33.3 MAJOR DEPRESSIVE DISORDER, RECURRENT EPISODE, SEVERE, WITH PSYCHOSIS (HCC): ICD-10-CM

## 2019-09-20 DIAGNOSIS — F43.21 GRIEF: ICD-10-CM

## 2019-09-20 PROCEDURE — 99213 OFFICE O/P EST LOW 20 MIN: CPT | Performed by: NURSE PRACTITIONER

## 2019-09-20 RX ORDER — ARIPIPRAZOLE 10 MG/1
10 TABLET ORAL DAILY
Qty: 30 TABLET | Refills: 0 | Status: SHIPPED | OUTPATIENT
Start: 2019-09-20 | End: 2019-10-29 | Stop reason: SDUPTHER

## 2019-09-20 RX ORDER — INFLUENZA A VIRUS A/MICHIGAN/45/2015 X-275 (H1N1) ANTIGEN (FORMALDEHYDE INACTIVATED), INFLUENZA A VIRUS A/SINGAPORE/INFIMH-16-0019/2016 IVR-186 (H3N2) ANTIGEN (FORMALDEHYDE INACTIVATED), INFLUENZA B VIRUS B/PHUKET/3073/2013 ANTIGEN (FORMALDEHYDE INACTIVATED), AND INFLUENZA B VIRUS B/MARYLAND/15/2016 BX-69A ANTIGEN (FORMALDEHYDE INACTIVATED) 15; 15; 15; 15 UG/.5ML; UG/.5ML; UG/.5ML; UG/.5ML
INJECTION, SUSPENSION INTRAMUSCULAR
Refills: 0 | COMMUNITY
Start: 2019-09-07 | End: 2020-09-23

## 2019-09-20 RX ORDER — HEPATITIS A AND HEPATITIS B (RECOMBINANT) VACCINE 720; 20 [IU]/ML; UG/ML
INJECTION, SUSPENSION INTRAMUSCULAR
Refills: 0 | COMMUNITY
Start: 2019-09-07 | End: 2020-09-23

## 2019-09-20 RX ORDER — DIAZEPAM 2 MG/1
2 TABLET ORAL DAILY PRN
Qty: 20 TABLET | Refills: 0 | Status: SHIPPED | OUTPATIENT
Start: 2019-09-20 | End: 2020-05-06 | Stop reason: SDDI

## 2019-09-20 RX ORDER — FLUOXETINE HYDROCHLORIDE 40 MG/1
80 CAPSULE ORAL DAILY
Qty: 60 CAPSULE | Refills: 0 | Status: SHIPPED | OUTPATIENT
Start: 2019-09-20 | End: 2019-10-29 | Stop reason: SDUPTHER

## 2019-09-20 NOTE — PROGRESS NOTES
Subjective   Cynthia Kelsey is a 43 y.o. female is here today for medication management follow-up.    Chief Complaint:  Depression, anxiety     History of Present Illness:   Patient presents today with  for follow-up for medication management.  Patient states she has been doing much better and has noticed a difference with the addition of the Abilify.  Patient states she can feel it starting to work however she is not where she wants to be.  Patient states she still has issues with having no patience when dealing with things.  Patient states she noticed a decrease in her depression however still rates it at a 5 on a 0-to-10 scale with 10 being the worst.  Patient states she still having symptoms of depression of crying, decreased energy, and irritation/agitation.  Patient states she has been feeling better though from what she was previously.  Patient reports she has been going to the gym for the past couple weeks with her daughter and has been doing daily classes.  Patient states this has helped with her mood.  Patient states she is still having increased anxiety and rates her anxiety at a 5 on a 0-to-10 scale with 10 being the worst.  Patient states still feeling overwhelmed as well as agitated.  Patient denies any panic attacks.  Patient states she is sleeping better and has no difficulty going to sleep now.  Patient states she is getting at least 6 hours of sleep at night.  Patient states she still having nightmares however they are not every night or as vivid as they were.  Patient states she has not been taking the prazosin due to make her feel very groggy the next morning.  Patient states her appetite is good and eating at least 3 meals per day.  Patient denies any visual hallucinations.  Patient states she still been hearing the voices of people talking.  Patient denies any command hallucinations.  Patient adamantly denies any SI or HI.  Denies any side effects to medications.  Patient denies  any medical changes since last visit.  The following portions of the patient's history were reviewed and updated as appropriate: allergies, current medications, past family history, past medical history, past social history, past surgical history and problem list.    Review of Systems   Constitutional: Positive for activity change.   Respiratory: Negative.    Cardiovascular: Negative.    Psychiatric/Behavioral: Positive for agitation, dysphoric mood, hallucinations and sleep disturbance. The patient is nervous/anxious.        Objective   Physical Exam   Constitutional: Vital signs are normal. She appears well-developed and well-nourished. She is cooperative.   Neurological: She is alert.   Psychiatric: Her speech is normal. Judgment and thought content normal. She is agitated. Cognition and memory are normal. She exhibits a depressed mood.   Vitals reviewed.    Blood pressure 138/82, pulse 87, weight 114 kg (252 lb 6.4 oz). Body mass index is 44.72 kg/m².    No Known Allergies    Medication List:   Current Outpatient Medications   Medication Sig Dispense Refill   • ARIPiprazole (ABILIFY) 5 MG tablet Take 1 tablet by mouth Daily. 30 tablet 0   • diazePAM (VALIUM) 2 MG tablet Take 1 tablet by mouth Daily As Needed for Anxiety. 10 tablet 0   • FLUoxetine (PROzac) 40 MG capsule Take 2 capsules by mouth Daily. 60 capsule 0   • hydrochlorothiazide (HYDRODIURIL) 25 MG tablet Take 25 mg by mouth Daily.  0   • ibuprofen (ADVIL,MOTRIN) 800 MG tablet Take 800 mg by mouth 3 (Three) Times a Day.  2   • pantoprazole (PROTONIX) 40 MG EC tablet Take 40 mg by mouth Daily.  2   • prazosin (MINIPRESS) 5 MG capsule Take 1 capsule by mouth Every Night. 30 capsule 0   • valACYclovir (VALTREX) 500 MG tablet   3     No current facility-administered medications for this visit.        Mental Status Exam:   Hygiene:   good  Cooperation:  Cooperative  Eye Contact:  Good  Psychomotor Behavior:  Appropriate  Affect:  Appropriate  Hopelessness:  Denies  Speech:  Normal  Thought Process:  Goal directed and Linear  Thought Content:  Normal  Suicidal:  None  Homicidal:  None  Hallucinations:  None  Delusion:  None  Memory:  Intact  Orientation:  Person, Place, Time and Situation  Reliability:  fair  Insight:  Fair  Judgement:  Fair  Impulse Control:  Fair  Physical/Medical Issues:  No                Assessment/Plan   Diagnoses and all orders for this visit:    Major depressive disorder, recurrent episode, severe, with psychosis (CMS/HCC)  -     ARIPiprazole (ABILIFY) 10 MG tablet; Take 1 tablet by mouth Daily.  -     diazePAM (VALIUM) 2 MG tablet; Take 1 tablet by mouth Daily As Needed for Anxiety.  -     FLUoxetine (PROzac) 40 MG capsule; Take 2 capsules by mouth Daily.    Grief  -     ARIPiprazole (ABILIFY) 10 MG tablet; Take 1 tablet by mouth Daily.  -     FLUoxetine (PROzac) 40 MG capsule; Take 2 capsules by mouth Daily.            Discussed medication options with patient and . Reviewed the risks, benefits, and side effects of the medications; patient acknowledged and verbally consented. Patient was instructed on medication side effects, benefits, and also of no treatment.  Patient was given an explanation regarding potential for increased risk of diabetes, lipids, and weight gain.  Labs will be assessed as clinically indicated.  Diet was discussed especially healthy diet choices and increasing activity and exercise.  Patient was strongly urged to continue weight maintance or weight loss efforts.  Patient reported verbalized understanding of instructions. Patient is agreeable to call the office with any questions, concerns, or worsening of symptoms.  Patient is aware to call 911 or go to the nearest ER should begin having SI/HI.   states he has still been locking up all firearms as well as medications.       Follow up in four weeks.        Errors in dictation may reflect use of voice recognition software and not all errors in transcription  may have been detected prior to signing.                This document has been electronically signed by FLAVIO Georges   September 20, 2019 8:49 AM

## 2019-10-29 DIAGNOSIS — F33.3 MAJOR DEPRESSIVE DISORDER, RECURRENT EPISODE, SEVERE, WITH PSYCHOSIS (HCC): ICD-10-CM

## 2019-10-29 DIAGNOSIS — F43.21 GRIEF: ICD-10-CM

## 2019-10-30 RX ORDER — FLUOXETINE HYDROCHLORIDE 40 MG/1
80 CAPSULE ORAL DAILY
Qty: 60 CAPSULE | Refills: 0 | Status: SHIPPED | OUTPATIENT
Start: 2019-10-30 | End: 2020-05-06 | Stop reason: SDDI

## 2019-10-30 RX ORDER — ARIPIPRAZOLE 10 MG/1
10 TABLET ORAL DAILY
Qty: 30 TABLET | Refills: 0 | Status: SHIPPED | OUTPATIENT
Start: 2019-10-30 | End: 2020-05-06 | Stop reason: SDDI

## 2020-05-06 ENCOUNTER — OFFICE VISIT (OUTPATIENT)
Dept: PSYCHIATRY | Facility: CLINIC | Age: 45
End: 2020-05-06

## 2020-05-06 VITALS
HEART RATE: 102 BPM | DIASTOLIC BLOOD PRESSURE: 76 MMHG | TEMPERATURE: 97.3 F | WEIGHT: 253 LBS | BODY MASS INDEX: 44.83 KG/M2 | SYSTOLIC BLOOD PRESSURE: 140 MMHG

## 2020-05-06 DIAGNOSIS — F33.3 MAJOR DEPRESSIVE DISORDER, RECURRENT EPISODE, SEVERE, WITH PSYCHOSIS (HCC): Primary | ICD-10-CM

## 2020-05-06 PROCEDURE — 99213 OFFICE O/P EST LOW 20 MIN: CPT | Performed by: NURSE PRACTITIONER

## 2020-05-06 RX ORDER — FUROSEMIDE 20 MG/1
20 TABLET ORAL DAILY
COMMUNITY
Start: 2020-04-18 | End: 2020-09-23 | Stop reason: SDUPTHER

## 2020-05-19 ENCOUNTER — OFFICE VISIT (OUTPATIENT)
Dept: PSYCHIATRY | Facility: CLINIC | Age: 45
End: 2020-05-19

## 2020-05-19 VITALS — BODY MASS INDEX: 45.19 KG/M2 | TEMPERATURE: 98 F | HEART RATE: 87 BPM | WEIGHT: 255 LBS

## 2020-05-19 DIAGNOSIS — F41.0 PANIC DISORDER WITHOUT AGORAPHOBIA: ICD-10-CM

## 2020-05-19 DIAGNOSIS — F41.1 GENERALIZED ANXIETY DISORDER: ICD-10-CM

## 2020-05-19 DIAGNOSIS — F33.3 MAJOR DEPRESSIVE DISORDER, RECURRENT EPISODE, SEVERE, WITH PSYCHOSIS (HCC): Primary | ICD-10-CM

## 2020-05-19 PROCEDURE — 99213 OFFICE O/P EST LOW 20 MIN: CPT | Performed by: NURSE PRACTITIONER

## 2020-05-19 RX ORDER — VILAZODONE HYDROCHLORIDE 40 MG/1
40 TABLET ORAL DAILY
Qty: 30 TABLET | Refills: 0 | Status: SHIPPED | OUTPATIENT
Start: 2020-05-19 | End: 2020-06-22

## 2020-05-19 RX ORDER — HYDROXYZINE PAMOATE 25 MG/1
25-50 CAPSULE ORAL NIGHTLY PRN
Qty: 45 CAPSULE | Refills: 0 | Status: SHIPPED | OUTPATIENT
Start: 2020-05-19 | End: 2020-05-29

## 2020-05-19 NOTE — PROGRESS NOTES
Subjective   Cynthia Kelsey is a 44 y.o. female is here today for medication management follow-up.    Chief Complaint:  Depression anxiety anger    History of Present Illness:   Patient presents today for follow up for medication management for depression and anxiety. Patient states she has noticed a difference with the edge being gone. Patient states she can tell the anxiety has calmed down since last time. Patient states still dealing with no patience and no tolerance. Patient states she has been taking off mondays and fridays from work and it has helped. Patient reports currently depression is at a 5-6 on a 0-10 scale with 10 being the worst. Patient reports symptoms of depression of irritability, decreased energy, decreased patience, depressed mood, and insomnia. Patient reports anxiety is at a 4-5 on a 0-10 scale with 10 being the worst. Patient denies any panic attacks. Patient states was up until 3 am this morning then slept until 6:30-7am. Patient states trying to do a routine to help with sleep but its not working. Patient reports not sleeping good and patient denies any nightmares. Patient reports appetite is good and eating at least 2-3 meals a day. Patient reports some auditory hallucinations. Patient states still hearing random things the same as she has before. Patient denies any visual hallucinations. Patient adamantly denies any SI or HI. Patient denies any side effects to medications. Patient denies any medical changes since last visit.   The following portions of the patient's history were reviewed and updated as appropriate: allergies, current medications, past family history, past medical history, past social history, past surgical history and problem list.    Review of Systems   Constitutional: Negative.    Respiratory: Negative.    Cardiovascular: Negative.    Neurological: Negative.    Psychiatric/Behavioral: Positive for agitation, dysphoric mood, hallucinations and sleep disturbance.  The patient is nervous/anxious.        Objective   Physical Exam   Constitutional: Vital signs are normal. She appears well-developed and well-nourished. She is cooperative.   Neurological: She is alert.   Psychiatric: Her speech is normal and behavior is normal. Judgment and thought content normal. Cognition and memory are normal. She exhibits a depressed mood.   Vitals reviewed.    Pulse 87, temperature 98 °F (36.7 °C), weight 116 kg (255 lb). Body mass index is 45.19 kg/m².    No Known Allergies    Medication List:   Current Outpatient Medications   Medication Sig Dispense Refill   • FLUZONE QUADRIVALENT 0.5 ML suspension prefilled syringe injection PHARMACIST ADMINISTERED IMMUNIZATION ADMINISTERED AT TIME OF DISPENSING  0   • furosemide (LASIX) 20 MG tablet Take 20 mg by mouth Daily.     • hydrochlorothiazide (HYDRODIURIL) 25 MG tablet Take 25 mg by mouth Daily.  0   • ibuprofen (ADVIL,MOTRIN) 800 MG tablet Take 800 mg by mouth 3 (Three) Times a Day.  2   • M-M-R II injection PHARMACIST ADMINISTERED IMMUNIZATION ADMINISTERED AT TIME OF DISPENSING  0   • pantoprazole (PROTONIX) 40 MG EC tablet Take 40 mg by mouth Daily.  2   • TWINRIX 720-20 ELU-MCG/ML injection PHARMACIST ADMINISTERED IMMUNIZATION ADMINISTERED AT TIME OF DISPENSING  0   • valACYclovir (VALTREX) 500 MG tablet   3   • Vilazodone HCl (Viibryd Starter Pack) 10 & 20 MG kit Take 10 mg by mouth Daily. 1 kit 0     No current facility-administered medications for this visit.        Mental Status Exam:   Hygiene:   good  Cooperation:  Cooperative  Eye Contact:  Fair  Psychomotor Behavior:  Appropriate  Affect:  Appropriate  Hopelessness: Denies  Speech:  Normal  Thought Process:  Goal directed and Linear  Thought Content:  Normal  Suicidal:  None  Homicidal:  None  Hallucinations:  None  Delusion:  None  Memory:  Intact  Orientation:  Person, Place, Time and Situation  Reliability:  fair  Insight:  Good  Judgement:  Good  Impulse Control:   Good  Physical/Medical Issues:  No                   Assessment/Plan   Diagnoses and all orders for this visit:    Major depressive disorder, recurrent episode, severe, with psychosis (CMS/HCC)  -     vilazodone (Viibryd) 40 MG tablet tablet; Take 1 tablet by mouth Daily.    Panic disorder without agoraphobia    Generalized anxiety disorder  -     vilazodone (Viibryd) 40 MG tablet tablet; Take 1 tablet by mouth Daily.  -     hydrOXYzine pamoate (VISTARIL) 25 MG capsule; Take 1-2 capsules by mouth At Night As Needed for Anxiety.            Discussed medication options with patient. Increase Viibryd to 40mg daily for worsening depression and anxiety. Start Hydroxyzine 25mg 1-2 capsules at bedtime as needed for anxiety. Reviewed the risks, benefits, and side effects of the medications; patient acknowledged and verbally consented. Discussed with patient to continue to do healthy sleep hygiene such as setting up routine for bedtime. Patient is agreeable to call the office with any questions, concerns, or worsening of symptoms. Patient is aware to call 911 or go to the nearest ER should begin having SI/HI.            Follow up in four weeks      Errors in dictation may reflect use of voice recognition software and not all errors in transcription may have been detected prior to signing.               This document has been electronically signed by FLAVIO Georges   May 19, 2020 08:09

## 2020-05-29 ENCOUNTER — TELEPHONE (OUTPATIENT)
Dept: PSYCHIATRY | Facility: CLINIC | Age: 45
End: 2020-05-29

## 2020-05-29 DIAGNOSIS — F33.3 MAJOR DEPRESSIVE DISORDER, RECURRENT EPISODE, SEVERE, WITH PSYCHOSIS (HCC): Primary | ICD-10-CM

## 2020-05-29 DIAGNOSIS — F41.1 GENERALIZED ANXIETY DISORDER: ICD-10-CM

## 2020-05-29 RX ORDER — DOXEPIN HYDROCHLORIDE 10 MG/1
10 CAPSULE ORAL NIGHTLY
Qty: 30 CAPSULE | Refills: 0 | Status: SHIPPED | OUTPATIENT
Start: 2020-05-29 | End: 2020-07-22 | Stop reason: SDUPTHER

## 2020-05-29 NOTE — TELEPHONE ENCOUNTER
Called patient and made her aware that Cynthia was sending in Doxepin to replace the Hydroxyzine. Made sure patient understood not to take the two medications together, and that she no longer needed to take the hydroxyzine.     Made patient aware if she experienced any shakiness, high fever, or extreme dizziness to stop medication and seek care immediately. Made patient aware of the possibility of increased thoughts of SI or HI and that if she began to have any she should seek the ED immediately.     Patient stated that she understood and would make us aware of any adverse reactions.

## 2020-06-22 DIAGNOSIS — F33.3 MAJOR DEPRESSIVE DISORDER, RECURRENT EPISODE, SEVERE, WITH PSYCHOSIS (HCC): ICD-10-CM

## 2020-06-22 DIAGNOSIS — F41.1 GENERALIZED ANXIETY DISORDER: ICD-10-CM

## 2020-06-22 RX ORDER — VILAZODONE HYDROCHLORIDE 40 MG/1
TABLET ORAL
Qty: 30 TABLET | Refills: 0 | Status: SHIPPED | OUTPATIENT
Start: 2020-06-22 | End: 2020-07-22 | Stop reason: SDUPTHER

## 2020-07-22 DIAGNOSIS — F33.3 MAJOR DEPRESSIVE DISORDER, RECURRENT EPISODE, SEVERE, WITH PSYCHOSIS (HCC): ICD-10-CM

## 2020-07-22 DIAGNOSIS — F41.1 GENERALIZED ANXIETY DISORDER: ICD-10-CM

## 2020-07-22 RX ORDER — VILAZODONE HYDROCHLORIDE 40 MG/1
40 TABLET ORAL DAILY
Qty: 30 TABLET | Refills: 0 | Status: SHIPPED | OUTPATIENT
Start: 2020-07-22 | End: 2020-09-03 | Stop reason: SDUPTHER

## 2020-07-22 RX ORDER — DOXEPIN HYDROCHLORIDE 10 MG/1
10 CAPSULE ORAL NIGHTLY
Qty: 30 CAPSULE | Refills: 0 | Status: SHIPPED | OUTPATIENT
Start: 2020-07-22 | End: 2020-09-03 | Stop reason: SDUPTHER

## 2020-09-03 DIAGNOSIS — F33.3 MAJOR DEPRESSIVE DISORDER, RECURRENT EPISODE, SEVERE, WITH PSYCHOSIS (HCC): ICD-10-CM

## 2020-09-03 DIAGNOSIS — F41.1 GENERALIZED ANXIETY DISORDER: ICD-10-CM

## 2020-09-03 RX ORDER — VILAZODONE HYDROCHLORIDE 40 MG/1
40 TABLET ORAL DAILY
Qty: 30 TABLET | Refills: 0 | Status: CANCELLED | OUTPATIENT
Start: 2020-09-03

## 2020-09-03 RX ORDER — VILAZODONE HYDROCHLORIDE 40 MG/1
40 TABLET ORAL DAILY
Qty: 30 TABLET | Refills: 0 | Status: SHIPPED | OUTPATIENT
Start: 2020-09-03 | End: 2020-09-28 | Stop reason: SDUPTHER

## 2020-09-03 RX ORDER — DOXEPIN HYDROCHLORIDE 10 MG/1
10 CAPSULE ORAL NIGHTLY
Qty: 30 CAPSULE | Refills: 0 | Status: SHIPPED | OUTPATIENT
Start: 2020-09-03 | End: 2020-09-28 | Stop reason: SDUPTHER

## 2020-09-03 RX ORDER — DIAZEPAM 2 MG/1
2 TABLET ORAL DAILY
Qty: 10 TABLET | Refills: 0 | Status: SHIPPED | OUTPATIENT
Start: 2020-09-03 | End: 2021-01-08

## 2020-09-03 RX ORDER — DOXEPIN HYDROCHLORIDE 10 MG/1
10 CAPSULE ORAL NIGHTLY
Qty: 30 CAPSULE | Refills: 0 | Status: CANCELLED | OUTPATIENT
Start: 2020-09-03

## 2020-09-23 ENCOUNTER — OFFICE VISIT (OUTPATIENT)
Dept: FAMILY MEDICINE CLINIC | Facility: CLINIC | Age: 45
End: 2020-09-23

## 2020-09-23 VITALS
BODY MASS INDEX: 46.38 KG/M2 | SYSTOLIC BLOOD PRESSURE: 140 MMHG | DIASTOLIC BLOOD PRESSURE: 80 MMHG | TEMPERATURE: 97.1 F | HEIGHT: 62 IN | OXYGEN SATURATION: 95 % | WEIGHT: 252 LBS | HEART RATE: 65 BPM

## 2020-09-23 DIAGNOSIS — F33.3 MAJOR DEPRESSIVE DISORDER, RECURRENT EPISODE, SEVERE, WITH PSYCHOSIS (HCC): Primary | ICD-10-CM

## 2020-09-23 DIAGNOSIS — E66.01 MORBID (SEVERE) OBESITY DUE TO EXCESS CALORIES (HCC): ICD-10-CM

## 2020-09-23 DIAGNOSIS — N95.1 MENOPAUSAL SYMPTOMS: ICD-10-CM

## 2020-09-23 DIAGNOSIS — R53.83 FATIGUE, UNSPECIFIED TYPE: ICD-10-CM

## 2020-09-23 DIAGNOSIS — F41.1 GENERALIZED ANXIETY DISORDER: ICD-10-CM

## 2020-09-23 PROCEDURE — 99204 OFFICE O/P NEW MOD 45 MIN: CPT | Performed by: NURSE PRACTITIONER

## 2020-09-23 PROCEDURE — 84443 ASSAY THYROID STIM HORMONE: CPT | Performed by: NURSE PRACTITIONER

## 2020-09-23 PROCEDURE — 83001 ASSAY OF GONADOTROPIN (FSH): CPT | Performed by: NURSE PRACTITIONER

## 2020-09-23 PROCEDURE — 80061 LIPID PANEL: CPT | Performed by: NURSE PRACTITIONER

## 2020-09-23 PROCEDURE — 84403 ASSAY OF TOTAL TESTOSTERONE: CPT | Performed by: NURSE PRACTITIONER

## 2020-09-23 PROCEDURE — 83036 HEMOGLOBIN GLYCOSYLATED A1C: CPT | Performed by: NURSE PRACTITIONER

## 2020-09-23 PROCEDURE — 82672 ASSAY OF ESTROGEN: CPT | Performed by: NURSE PRACTITIONER

## 2020-09-23 PROCEDURE — 83002 ASSAY OF GONADOTROPIN (LH): CPT | Performed by: NURSE PRACTITIONER

## 2020-09-23 PROCEDURE — 82306 VITAMIN D 25 HYDROXY: CPT | Performed by: NURSE PRACTITIONER

## 2020-09-23 PROCEDURE — 85025 COMPLETE CBC W/AUTO DIFF WBC: CPT | Performed by: NURSE PRACTITIONER

## 2020-09-23 PROCEDURE — 84144 ASSAY OF PROGESTERONE: CPT | Performed by: NURSE PRACTITIONER

## 2020-09-23 PROCEDURE — 80053 COMPREHEN METABOLIC PANEL: CPT | Performed by: NURSE PRACTITIONER

## 2020-09-23 PROCEDURE — 84681 ASSAY OF C-PEPTIDE: CPT | Performed by: NURSE PRACTITIONER

## 2020-09-23 PROCEDURE — 82607 VITAMIN B-12: CPT | Performed by: NURSE PRACTITIONER

## 2020-09-23 RX ORDER — FUROSEMIDE 20 MG/1
20 TABLET ORAL DAILY PRN
Qty: 30 TABLET | Refills: 2 | Status: SHIPPED | OUTPATIENT
Start: 2020-09-23 | End: 2020-11-09

## 2020-09-23 RX ORDER — POTASSIUM CHLORIDE 750 MG/1
10 TABLET, FILM COATED, EXTENDED RELEASE ORAL DAILY PRN
Qty: 30 TABLET | Refills: 2 | Status: SHIPPED | OUTPATIENT
Start: 2020-09-23 | End: 2021-01-20

## 2020-09-23 RX ORDER — VALACYCLOVIR HYDROCHLORIDE 500 MG/1
500 TABLET, FILM COATED ORAL 3 TIMES DAILY PRN
Qty: 30 TABLET | Refills: 3 | Status: SHIPPED | OUTPATIENT
Start: 2020-09-23 | End: 2020-11-30 | Stop reason: SDUPTHER

## 2020-09-23 NOTE — PROGRESS NOTES
Subjective   Cynthia Kelsey is a 44 y.o. female.     No chief complaint on file.    Chief complaint  Weight gain  Changes in menstrual cycle  Difficulty sleeping  New patient today to establish care    History of Present Illness:    Had been seeing a NP in [place] but the office turned into an urgent care.     Thinks going through menopause.     Lost her mom and brother in the same year ago. Having difficulty adjusting. Is in counseling.   During the 3-4 days before her period she has mood swings and starts feeling angry and trying to pick a fight with family members.  Denies any thoughts of hurting self or others.    Obesity - struggles with her weight for years. She is exercising several times a week . Limits calorie intake, goes to the gym and tries to live healthy lifestyle.  Has not completely cut out carbs.    Menstrual cycle changes- cycles are getting shorter but bleeding is heavier.  Cycle last about 4 days.  Fairly regular.  No pain with sex.    Difficulty sleeping- taking medication to help her go to sleep and stay asleep.  Seems to think more about her mom and her brother at night when she is trying to go to sleep.      The following portions of the patient's history and ROS were reviewed and updated as appropriate per provider:  Allergies, current medications, past family history, past medical history, past social history, past surgical history and problem list.    Review of Systems   Constitutional: Negative for activity change, chills, fatigue and fever.   Eyes: Negative.    Respiratory: Negative for cough, chest tightness, shortness of breath and wheezing.    Cardiovascular: Positive for leg swelling. Negative for chest pain and palpitations.   Gastrointestinal: Negative for abdominal pain, blood in stool, constipation, diarrhea and nausea.   Endocrine: Negative.    Genitourinary: Positive for menstrual problem (heavy and short). Negative for dyspareunia, dysuria and flank pain.   Musculoskeletal:  "Negative for arthralgias, back pain and neck stiffness.   Skin: Negative.    Allergic/Immunologic: Negative for environmental allergies and food allergies.   Neurological: Negative for seizures, numbness and headaches.   Hematological: Negative for adenopathy.   Psychiatric/Behavioral: Positive for sleep disturbance. Negative for self-injury and suicidal ideas.       Objective     /80   Pulse 65   Temp 97.1 °F (36.2 °C) (Tympanic)   Ht 157.5 cm (62\")   Wt 114 kg (252 lb)   SpO2 95%   BMI 46.09 kg/m²   No results found for any previous visit.       Physical Exam  Vitals signs and nursing note reviewed.   Constitutional:       General: She is not in acute distress.     Appearance: She is well-developed. She is obese. She is not ill-appearing, toxic-appearing or diaphoretic.   HENT:      Head: Normocephalic and atraumatic.      Right Ear: Tympanic membrane, ear canal and external ear normal. There is no impacted cerumen.      Left Ear: Tympanic membrane, ear canal and external ear normal. There is no impacted cerumen.      Nose: Nose normal. No congestion or rhinorrhea.      Mouth/Throat:      Mouth: Mucous membranes are moist.      Pharynx: Oropharynx is clear. No oropharyngeal exudate or posterior oropharyngeal erythema.   Eyes:      General:         Right eye: No discharge.         Left eye: No discharge.      Conjunctiva/sclera: Conjunctivae normal.      Pupils: Pupils are equal, round, and reactive to light.   Neck:      Musculoskeletal: Normal range of motion and neck supple. No neck rigidity or muscular tenderness.      Thyroid: No thyromegaly.   Cardiovascular:      Rate and Rhythm: Normal rate and regular rhythm.      Pulses: Normal pulses.      Heart sounds: Normal heart sounds. No murmur. No friction rub. No gallop.    Pulmonary:      Effort: Pulmonary effort is normal. No respiratory distress.      Breath sounds: Normal breath sounds. No wheezing.   Abdominal:      General: Abdomen is " protuberant. Bowel sounds are normal. There is no distension.      Palpations: Abdomen is soft. There is no mass.      Tenderness: There is no abdominal tenderness. There is no guarding or rebound.   Musculoskeletal: Normal range of motion.         General: No tenderness or deformity.   Lymphadenopathy:      Cervical: No cervical adenopathy.      Right cervical: No superficial or deep cervical adenopathy.     Left cervical: No superficial or deep cervical adenopathy.   Skin:     General: Skin is warm and dry.      Capillary Refill: Capillary refill takes less than 2 seconds.      Coloration: Skin is not cyanotic or pale.      Findings: No erythema or rash.      Nails: There is no clubbing.     Neurological:      General: No focal deficit present.      Mental Status: She is alert and oriented to person, place, and time.      Deep Tendon Reflexes: Reflexes are normal and symmetric.   Psychiatric:         Attention and Perception: Attention and perception normal.         Mood and Affect: Mood and affect normal.         Speech: Speech normal.         Behavior: Behavior normal. Behavior is cooperative.         Thought Content: Thought content normal.         Cognition and Memory: Cognition normal.         Judgment: Judgment normal.         Assessment/Plan     Problem List Items Addressed This Visit     None      Visit Diagnoses     Major depressive disorder, recurrent episode, severe, with psychosis (CMS/[facility])    -  Primary    Relevant Orders    CBC & Differential (Completed)    Comprehensive Metabolic Panel (Completed)    TSH (Completed)    Hemoglobin A1c (Completed)    Vitamin D 25 Hydroxy (Completed)    Vitamin B12 (Completed)    Lipid Panel (Completed)    FSH & LH (Completed)    Estrogens, Total    Progesterone (Completed)    C-Peptide    Testosterone (Completed)    CBC Auto Differential (Completed)    Generalized anxiety disorder        Relevant Orders    CBC & Differential (Completed)    Comprehensive Metabolic  Panel (Completed)    TSH (Completed)    Hemoglobin A1c (Completed)    Vitamin D 25 Hydroxy (Completed)    Vitamin B12 (Completed)    Lipid Panel (Completed)    FSH & LH (Completed)    Estrogens, Total    Progesterone (Completed)    C-Peptide    Testosterone (Completed)    CBC Auto Differential (Completed)    Morbid (severe) obesity due to excess calories (CMS/[facility])        Relevant Orders    CBC & Differential (Completed)    Comprehensive Metabolic Panel (Completed)    TSH (Completed)    Hemoglobin A1c (Completed)    Vitamin D 25 Hydroxy (Completed)    Vitamin B12 (Completed)    Lipid Panel (Completed)    FSH & LH (Completed)    Estrogens, Total    Progesterone (Completed)    C-Peptide    Testosterone (Completed)    CBC Auto Differential (Completed)    Fatigue, unspecified type        Relevant Orders    CBC & Differential (Completed)    Comprehensive Metabolic Panel (Completed)    TSH (Completed)    Hemoglobin A1c (Completed)    Vitamin D 25 Hydroxy (Completed)    Vitamin B12 (Completed)    Lipid Panel (Completed)    FSH & LH (Completed)    Estrogens, Total    Progesterone (Completed)    C-Peptide    Testosterone (Completed)    CBC Auto Differential (Completed)    Menopausal symptoms        Relevant Orders    FSH & LH (Completed)    Estrogens, Total    Progesterone (Completed)    C-Peptide    Testosterone (Completed)          Current Outpatient Medications:   •  diazePAM (Valium) 2 MG tablet, Take 1 tablet by mouth Daily., Disp: 10 tablet, Rfl: 0  •  doxepin (SINEquan) 10 MG capsule, Take 1 capsule by mouth Every Night., Disp: 30 capsule, Rfl: 0  •  furosemide (LASIX) 20 MG tablet, Take 1 tablet by mouth Daily As Needed (swelling)., Disp: 30 tablet, Rfl: 2  •  ibuprofen (ADVIL,MOTRIN) 800 MG tablet, Take 800 mg by mouth 3 (Three) Times a Day., Disp: , Rfl: 2  •  valACYclovir (VALTREX) 500 MG tablet, Take 1 tablet by mouth 3 (Three) Times a Day As Needed (cold sore)., Disp: 30 tablet, Rfl: 3  •  vilazodone (Viibryd) 40  MG tablet tablet, Take 1 tablet by mouth Daily., Disp: 30 tablet, Rfl: 0  •  potassium chloride 10 MEQ CR tablet, Take 1 tablet by mouth Daily As Needed (when takes lasix)., Disp: 30 tablet, Rfl: 2    I have reviewed medication list and discussed with patient the possible side effects and interactions.  We have discussed purpose for medication, route, dosage, frequency.  Refill routine medications.    Medical records reviewed and discussed with patient.    Fasting labs ordered.  Will obtain screening labs for hormone levels.    Keto diet plan discussed.  Individualized diet developed.  She is going to stop today and purchase some low-carb wraps and no carb bread.  She is going to cut out high carb foods and concentrated sweets.  She is going to start eating small frequent meals instead of 2 large meals a day.  She is going to substitute a protein shake for 1 meal a day.  Discussed portion size.  Goal will be at least 5 pound weight loss over the next 4 weeks.       Patient's Body mass index is 46.09 kg/m². BMI is above normal parameters. Recommendations include: exercise counseling and nutrition counseling.    Coronavirus precautions have been reviewed and discussed.  I have discussed the CDC recommendations  of social distancing, hand washing and disinfecting commonly touched items. Reviewed need to notify PCP and self quarantine with mild symptoms.  Discussed procedure to obtain Covid-19 testing and notification of PCP/health dept/ED/Urgent Center if symptoms begin. Understanding verbalized.      I have discussed diagnosis in detail today allowing time for questions and answers. Patient is aware of reasons to seek urgent or emergent medical care as well as reasons to return to the clinic for evaluation. Possible side effects, interactions and progression of symptoms discussed as well. Patient / family states understanding.   Emotional support and active listening provided.     Follow-up in 6-8 weeks, sooner if  needed.          This document has been electronically signed by:  FLAVIO Daly, NP-C

## 2020-09-24 LAB
25(OH)D3 SERPL-MCNC: 29.3 NG/ML (ref 30–100)
ALBUMIN SERPL-MCNC: 4.5 G/DL (ref 3.5–5.2)
ALBUMIN/GLOB SERPL: 1.4 G/DL
ALP SERPL-CCNC: 92 U/L (ref 39–117)
ALT SERPL W P-5'-P-CCNC: 21 U/L (ref 1–33)
ANION GAP SERPL CALCULATED.3IONS-SCNC: 13.8 MMOL/L (ref 5–15)
AST SERPL-CCNC: 18 U/L (ref 1–32)
BASOPHILS # BLD AUTO: 0.03 10*3/MM3 (ref 0–0.2)
BASOPHILS NFR BLD AUTO: 0.4 % (ref 0–1.5)
BILIRUB SERPL-MCNC: 0.4 MG/DL (ref 0–1.2)
BUN SERPL-MCNC: 11 MG/DL (ref 6–20)
BUN/CREAT SERPL: 11 (ref 7–25)
CALCIUM SPEC-SCNC: 10.1 MG/DL (ref 8.6–10.5)
CHLORIDE SERPL-SCNC: 102 MMOL/L (ref 98–107)
CHOLEST SERPL-MCNC: 229 MG/DL (ref 0–200)
CO2 SERPL-SCNC: 24.2 MMOL/L (ref 22–29)
CREAT SERPL-MCNC: 1 MG/DL (ref 0.57–1)
DEPRECATED RDW RBC AUTO: 42.6 FL (ref 37–54)
EOSINOPHIL # BLD AUTO: 0.03 10*3/MM3 (ref 0–0.4)
EOSINOPHIL NFR BLD AUTO: 0.4 % (ref 0.3–6.2)
ERYTHROCYTE [DISTWIDTH] IN BLOOD BY AUTOMATED COUNT: 13.8 % (ref 12.3–15.4)
FSH SERPL-ACNC: 5.43 MIU/ML
GFR SERPL CREATININE-BSD FRML MDRD: 60 ML/MIN/1.73
GLOBULIN UR ELPH-MCNC: 3.2 GM/DL
GLUCOSE SERPL-MCNC: 93 MG/DL (ref 65–99)
HBA1C MFR BLD: 5.55 % (ref 4.8–5.6)
HCT VFR BLD AUTO: 40.5 % (ref 34–46.6)
HDLC SERPL-MCNC: 54 MG/DL (ref 40–60)
HGB BLD-MCNC: 13.6 G/DL (ref 12–15.9)
IMM GRANULOCYTES # BLD AUTO: 0.02 10*3/MM3 (ref 0–0.05)
IMM GRANULOCYTES NFR BLD AUTO: 0.3 % (ref 0–0.5)
LDLC SERPL CALC-MCNC: 153 MG/DL (ref 0–100)
LDLC/HDLC SERPL: 2.83 {RATIO}
LH SERPL-ACNC: 5.89 MIU/ML
LYMPHOCYTES # BLD AUTO: 2.05 10*3/MM3 (ref 0.7–3.1)
LYMPHOCYTES NFR BLD AUTO: 28.7 % (ref 19.6–45.3)
MCH RBC QN AUTO: 28.6 PG (ref 26.6–33)
MCHC RBC AUTO-ENTMCNC: 33.6 G/DL (ref 31.5–35.7)
MCV RBC AUTO: 85.1 FL (ref 79–97)
MONOCYTES # BLD AUTO: 0.38 10*3/MM3 (ref 0.1–0.9)
MONOCYTES NFR BLD AUTO: 5.3 % (ref 5–12)
NEUTROPHILS NFR BLD AUTO: 4.63 10*3/MM3 (ref 1.7–7)
NEUTROPHILS NFR BLD AUTO: 64.9 % (ref 42.7–76)
NRBC BLD AUTO-RTO: 0 /100 WBC (ref 0–0.2)
PLATELET # BLD AUTO: 249 10*3/MM3 (ref 140–450)
PMV BLD AUTO: 11.5 FL (ref 6–12)
POTASSIUM SERPL-SCNC: 4.1 MMOL/L (ref 3.5–5.2)
PROGEST SERPL-MCNC: 9.83 NG/ML
PROT SERPL-MCNC: 7.7 G/DL (ref 6–8.5)
RBC # BLD AUTO: 4.76 10*6/MM3 (ref 3.77–5.28)
SODIUM SERPL-SCNC: 140 MMOL/L (ref 136–145)
TESTOST SERPL-MCNC: 32.3 NG/DL (ref 8.4–48.1)
TRIGL SERPL-MCNC: 111 MG/DL (ref 0–150)
TSH SERPL DL<=0.05 MIU/L-ACNC: 0.77 UIU/ML (ref 0.27–4.2)
VIT B12 BLD-MCNC: 688 PG/ML (ref 211–946)
VLDLC SERPL-MCNC: 22.2 MG/DL (ref 5–40)
WBC # BLD AUTO: 7.14 10*3/MM3 (ref 3.4–10.8)

## 2020-09-25 LAB — C PEPTIDE SERPL-MCNC: 2.5 NG/ML (ref 1.1–4.4)

## 2020-09-28 ENCOUNTER — TELEMEDICINE (OUTPATIENT)
Dept: PSYCHIATRY | Facility: CLINIC | Age: 45
End: 2020-09-28

## 2020-09-28 DIAGNOSIS — F33.3 MAJOR DEPRESSIVE DISORDER, RECURRENT EPISODE, SEVERE, WITH PSYCHOSIS (HCC): ICD-10-CM

## 2020-09-28 DIAGNOSIS — F41.1 GENERALIZED ANXIETY DISORDER: ICD-10-CM

## 2020-09-28 LAB — ESTROGEN SERPL-MCNC: 172 PG/ML

## 2020-09-28 PROCEDURE — 99213 OFFICE O/P EST LOW 20 MIN: CPT | Performed by: NURSE PRACTITIONER

## 2020-09-28 RX ORDER — VILAZODONE HYDROCHLORIDE 40 MG/1
40 TABLET ORAL DAILY
Qty: 30 TABLET | Refills: 4 | Status: SHIPPED | OUTPATIENT
Start: 2020-09-28 | End: 2021-01-20 | Stop reason: SDUPTHER

## 2020-09-28 RX ORDER — DOXEPIN HYDROCHLORIDE 25 MG/1
25 CAPSULE ORAL NIGHTLY
Qty: 30 CAPSULE | Refills: 0 | Status: SHIPPED | OUTPATIENT
Start: 2020-09-28 | End: 2020-10-05

## 2020-09-28 NOTE — PROGRESS NOTES
Subjective   Cynthia Kelsey is a 44 y.o. female is here today for medication management follow-up.    This provider is located at [facility] at [address] [place] [address]. The Patient is seen remotely at work, using Zoom via My Chart nallely. Patient is being seen via telehealth and stated they are in a secure environment for this session. The patient's condition being diagnosed/treated is appropriate for telemedicine. The provider identified him/herself as well as his or her credentials. The patient and/or patients guardian consent to be seen remotely, and when consent is given they understand that the consent allows for patient identifiable information to be sent to a third party as needed. They may refuse to be seen remotely at any time. The electronic data is encrypted and password protected, and the patient has been advised of the potential risks to privacy not withstanding such measures.      Chief Complaint:  Depression, anxiety, anger    History of Present Illness:    Patient presents today for follow up for medication management for depression, anger, and anxiety. Patient states having good days and bad days. Patient states still working at [employer] and recently got a new boss. Patient states she is taking some time off. Patient states still taking the medication. Patient states having a few rough days here and there. Patient states son is getting  this Saturday and she is having a lot of stress dealing with that. Patient reports currently depression is at a 6-8 on a 0-10 scale with 10 being the worst. Patient reports symptoms of depression of irritability, depressed mood, insomnia, overwhelmed, and low motivation. Patient states irritability and anger are worse when stressed and having been occurring more frequent. Patient reports anxiety is at a 6-8 on a 0-10 scale with 10 being the worst. Patient reports having a couple panic attacks. Patient reports panic attacks last a  few minutes. Patient states taking the sleeping medication but its still taking hours to fall asleep and some nights she is having to take something else to help. Patient states she will take the medicine and hours later fall asleep then oversleep. Patient reports sleeping 3-4 hours a night and patient reports having frequent nightmares. Patient reports appetite is good and eating 2-3 meals a day. Patient states she has been going back to the gym and it has helped with her mood. Patient denies any auditory or visual hallucinations. Patient adamantly denies any SI or HI. Patient denies any side effects to medications. Patient denies any medical changes since last visit.   The following portions of the patient's history were reviewed and updated as appropriate: allergies, current medications, past family history, past medical history, past social history, past surgical history and problem list.    Review of Systems   Constitutional: Negative.    Respiratory: Negative.    Cardiovascular: Negative.    Gastrointestinal: Negative.    Neurological: Negative.    Psychiatric/Behavioral: Positive for agitation, dysphoric mood and sleep disturbance. The patient is nervous/anxious.        Objective   Physical Exam  Constitutional:       Appearance: Normal appearance. She is well-developed, well-groomed and normal weight.   Neurological:      Mental Status: She is alert.   Psychiatric:         Attention and Perception: Attention and perception normal.         Mood and Affect: Affect normal. Mood is depressed.         Speech: Speech normal.         Behavior: Behavior normal. Behavior is cooperative.         Thought Content: Thought content normal.         Cognition and Memory: Cognition and memory normal.         Judgment: Judgment normal.       There were no vitals taken for this visit. There is no height or weight on file to calculate BMI.   Unable to obtain vitals due to video visit.     No Known Allergies    Medication List:     Current Outpatient Medications   Medication Sig Dispense Refill   • diazePAM (Valium) 2 MG tablet Take 1 tablet by mouth Daily. 10 tablet 0   • doxepin (SINEquan) 25 MG capsule Take 1 capsule by mouth Every Night. 30 capsule 0   • furosemide (LASIX) 20 MG tablet Take 1 tablet by mouth Daily As Needed (swelling). 30 tablet 2   • ibuprofen (ADVIL,MOTRIN) 800 MG tablet Take 800 mg by mouth 3 (Three) Times a Day.  2   • potassium chloride 10 MEQ CR tablet Take 1 tablet by mouth Daily As Needed (when takes lasix). 30 tablet 2   • valACYclovir (VALTREX) 500 MG tablet Take 1 tablet by mouth 3 (Three) Times a Day As Needed (cold sore). 30 tablet 3   • vilazodone (Viibryd) 40 MG tablet tablet Take 1 tablet by mouth Daily. 30 tablet 4     No current facility-administered medications for this visit.        Mental Status Exam:   Hygiene:   good  Cooperation:  Cooperative  Eye Contact:  Good  Psychomotor Behavior:  Appropriate  Affect:  Appropriate  Hopelessness: Denies  Speech:  Normal  Thought Process:  Goal directed and Linear  Thought Content:  Normal  Suicidal:  None  Homicidal:  None  Hallucinations:  None  Delusion:  None  Memory:  Intact  Orientation:  Person, Place, Time and Situation  Reliability:  good  Insight:  Fair  Judgement:  Fair  Impulse Control:  Fair  Physical/Medical Issues:  No               Assessment/Plan   Cynthia was seen today for med management.    Diagnoses and all orders for this visit:    Generalized anxiety disorder  -     doxepin (SINEquan) 25 MG capsule; Take 1 capsule by mouth Every Night.  -     vilazodone (Viibryd) 40 MG tablet tablet; Take 1 tablet by mouth Daily.    Major depressive disorder, recurrent episode, severe, with psychosis (CMS/HCC)  -     doxepin (SINEquan) 25 MG capsule; Take 1 capsule by mouth Every Night.  -     vilazodone (Viibryd) 40 MG tablet tablet; Take 1 tablet by mouth Daily.            Discussed medication options with patient. Increase Doxepin to 25mg daily for  worsening insomnia and anxiety. Cont. Viibryd 40mg daily for depression and anxiety. Reviewed the risks, benefits, and side effects of the medications; patient acknowledged and verbally consented. Discussed with patient if still not sleeping by Thursday to call the office and will adjust medication. Patient is agreeable to call the office with any questions, concerns, or worsening of symptoms. Patient is aware to call 911 or go to the nearest ER should begin having SI/HI.            Follow up in four weeks         Errors in dictation may reflect use of voice recognition software and not all errors in transcription may have been detected prior to signing.              This document has been electronically signed by FLAVIO Georges   September 28, 2020 09:14 EDT

## 2020-10-05 DIAGNOSIS — F41.8 INSOMNIA SECONDARY TO DEPRESSION WITH ANXIETY: ICD-10-CM

## 2020-10-05 DIAGNOSIS — F33.3 MAJOR DEPRESSIVE DISORDER, RECURRENT EPISODE, SEVERE, WITH PSYCHOSIS (HCC): Primary | ICD-10-CM

## 2020-10-05 DIAGNOSIS — F51.05 INSOMNIA SECONDARY TO DEPRESSION WITH ANXIETY: ICD-10-CM

## 2020-10-05 RX ORDER — RAMELTEON 8 MG/1
4 TABLET ORAL NIGHTLY
Qty: 15 TABLET | Refills: 0 | Status: SHIPPED | OUTPATIENT
Start: 2020-10-05 | End: 2020-11-30 | Stop reason: SDUPTHER

## 2020-10-05 NOTE — PROGRESS NOTES
10/05/20 at 3.53pm Spoke with patient regarding sleep medication. Discussed with patient to stop Doxepin. Discussed with patient starting Rozerem 0.5tab at night to help with insomnia. Discussed with patient risks, benefits, and side effects of medication. Patient acknowledged and agreed. Discussed with patient if no improvement in a week with medication then to call the office. Discussed with patient if having any SI or HI to call 911 or go to the nearest ER.

## 2020-11-09 RX ORDER — FUROSEMIDE 20 MG/1
TABLET ORAL
Qty: 90 TABLET | Refills: 0 | Status: SHIPPED | OUTPATIENT
Start: 2020-11-09 | End: 2020-11-30 | Stop reason: SDUPTHER

## 2020-11-30 DIAGNOSIS — F51.05 INSOMNIA SECONDARY TO DEPRESSION WITH ANXIETY: ICD-10-CM

## 2020-11-30 DIAGNOSIS — F41.8 INSOMNIA SECONDARY TO DEPRESSION WITH ANXIETY: ICD-10-CM

## 2020-11-30 RX ORDER — FUROSEMIDE 20 MG/1
20 TABLET ORAL DAILY PRN
Qty: 90 TABLET | Refills: 0 | Status: SHIPPED | OUTPATIENT
Start: 2020-11-30 | End: 2021-01-20 | Stop reason: SDUPTHER

## 2020-11-30 RX ORDER — VALACYCLOVIR HYDROCHLORIDE 500 MG/1
500 TABLET, FILM COATED ORAL 3 TIMES DAILY PRN
Qty: 30 TABLET | Refills: 3 | Status: SHIPPED | OUTPATIENT
Start: 2020-11-30 | End: 2021-01-01 | Stop reason: SDUPTHER

## 2020-12-01 RX ORDER — RAMELTEON 8 MG/1
4 TABLET ORAL NIGHTLY
Qty: 15 TABLET | Refills: 1 | Status: SHIPPED | OUTPATIENT
Start: 2020-12-01 | End: 2021-01-01 | Stop reason: SDUPTHER

## 2021-01-01 DIAGNOSIS — F51.05 INSOMNIA SECONDARY TO DEPRESSION WITH ANXIETY: ICD-10-CM

## 2021-01-01 DIAGNOSIS — F41.1 GENERALIZED ANXIETY DISORDER: ICD-10-CM

## 2021-01-01 DIAGNOSIS — F41.8 INSOMNIA SECONDARY TO DEPRESSION WITH ANXIETY: ICD-10-CM

## 2021-01-04 RX ORDER — DIAZEPAM 2 MG/1
2 TABLET ORAL DAILY
Qty: 10 TABLET | Refills: 0 | OUTPATIENT
Start: 2021-01-04 | End: 2022-01-04

## 2021-01-04 RX ORDER — VALACYCLOVIR HYDROCHLORIDE 500 MG/1
500 TABLET, FILM COATED ORAL 3 TIMES DAILY PRN
Qty: 30 TABLET | Refills: 3 | Status: SHIPPED | OUTPATIENT
Start: 2021-01-04 | End: 2021-01-20 | Stop reason: SDUPTHER

## 2021-01-04 RX ORDER — RAMELTEON 8 MG/1
4 TABLET ORAL NIGHTLY
Qty: 15 TABLET | Refills: 1 | Status: SHIPPED | OUTPATIENT
Start: 2021-01-04 | End: 2021-01-20

## 2021-01-04 NOTE — TELEPHONE ENCOUNTER
Called patient and left a voice mail letting her know she would need an appointment in order to receive this medication.

## 2021-01-04 NOTE — TELEPHONE ENCOUNTER
This isn't an appropriate refill. As she hasn't been seen since Sept. If she wants this she needs to make an appointment with the [facility]. No UDS on file.

## 2021-01-06 ENCOUNTER — TELEPHONE (OUTPATIENT)
Dept: FAMILY MEDICINE CLINIC | Facility: CLINIC | Age: 46
End: 2021-01-06

## 2021-01-06 NOTE — TELEPHONE ENCOUNTER
Patient called and is wanting to see someone in the [facility] for refills and to just speak to someone.

## 2021-01-08 ENCOUNTER — TELEMEDICINE (OUTPATIENT)
Dept: PSYCHIATRY | Facility: CLINIC | Age: 46
End: 2021-01-08

## 2021-01-08 DIAGNOSIS — F41.8 INSOMNIA SECONDARY TO DEPRESSION WITH ANXIETY: ICD-10-CM

## 2021-01-08 DIAGNOSIS — F41.1 GENERALIZED ANXIETY DISORDER: ICD-10-CM

## 2021-01-08 DIAGNOSIS — F51.05 INSOMNIA SECONDARY TO DEPRESSION WITH ANXIETY: ICD-10-CM

## 2021-01-08 DIAGNOSIS — F33.1 MODERATE EPISODE OF RECURRENT MAJOR DEPRESSIVE DISORDER (HCC): Primary | ICD-10-CM

## 2021-01-08 PROCEDURE — 90792 PSYCH DIAG EVAL W/MED SRVCS: CPT | Performed by: NURSE PRACTITIONER

## 2021-01-08 RX ORDER — TRAZODONE HYDROCHLORIDE 100 MG/1
TABLET ORAL
Qty: 30 TABLET | Refills: 1 | Status: SHIPPED | OUTPATIENT
Start: 2021-01-08 | End: 2021-01-20 | Stop reason: SDUPTHER

## 2021-01-08 NOTE — PROGRESS NOTES
This provider is located at the [facility] (through [facility]), [address], [facility], [address] using a secure MyChart Video Visit through [employer]. Patient is being seen remotely via telehealth at their home address in [place], and stated they are in a secure environment for this session. The patient's condition being diagnosed/treated is appropriate for telemedicine. The provider identified herself as well as her credentials.   The patient, and/or patients guardian, consent to be seen remotely, and when consent is given they understand that the consent allows for patient identifiable information to be sent to a third party as needed.   They may refuse to be seen remotely at any time. The electronic data is encrypted and password protected, and the patient and/or guardian has been advised of the potential risks to privacy not withstanding such measures.    Subjective   Cynthia Kelsey is a 45 y.o. female who presents today for initial evaluation     Chief Complaint:  Depression    History of Present Illness:   Here today for an initial evaluation with me today, she usually sees Cynthia MUNIZ.  Has history of depression and anxiety, initially beginning approximately 2 and half years ago when her mother was accidentally killed.  Her brother committed suicide 6 months later.  She continues to have nightmares at night, she states in those she is looking for her mother, and asking for help.  She states she wakes her  up she is screaming.  She is currently prescribed Viibryd 40 mg 1 p.o. daily.  Remeron 8 mg half a tablet p.o. nightly.  But she states she has been taking 1 pill at night instead and it is still not helping her sleep.The patient reports depressive symptoms including depressed mood, crying spells, insomnia and low energy, and have caused impairment in important areas of functioning.  Depression rated 7/10 with 10 being the worst. The patient  reports the following symptoms of anxiety: constant anxiety/worry, difficulty concentrating and sleep disturbance and have caused impairment in important areas of functioning. Anxiety rated 8/10 with 10 being the worst. Denies SI/HI/AVH.  Denies thoughts of self-harm.  She states that she needs to be able to work, I cannot miss work, so I need to be able to get some sleep at night to be at work on time. States she has plenty of Viibryd, doesn't need a refill today.       The following portions of the patient's history were reviewed and updated as appropriate: allergies, current medications, past family history, past medical history, past social history, past surgical history and problem list.      Past Medical History:  Past Medical History:   Diagnosis Date   • GERD (gastroesophageal reflux disease)        Social History:  Social History     Socioeconomic History   • Marital status:      Spouse name: Not on file   • Number of children: Not on file   • Years of education: Not on file   • Highest education level: Not on file   Tobacco Use   • Smoking status: Former Smoker     Types: Cigarettes     Quit date: 9/23/2010     Years since quitting: 10.3   • Smokeless tobacco: Never Used   Substance and Sexual Activity   • Alcohol use: Never     Frequency: Never   • Drug use: Never   • Sexual activity: Yes     Partners: Male     Birth control/protection: None       Family History:  Family History   Problem Relation Age of Onset   • Suicidality Father    • Alcohol abuse Brother        Past Surgical History:  Past Surgical History:   Procedure Laterality Date   • TUBAL ABDOMINAL LIGATION         Problem List:  There is no problem list on file for this patient.       Allergy:   No Known Allergies     Current Medications:   Current Outpatient Medications   Medication Sig Dispense Refill   • furosemide (LASIX) 20 MG tablet Take 1 tablet by mouth Daily As Needed (as needed). 90 tablet 0   • ibuprofen (ADVIL,MOTRIN) 800 MG  tablet Take 800 mg by mouth 3 (Three) Times a Day.  2   • potassium chloride 10 MEQ CR tablet Take 1 tablet by mouth Daily As Needed (when takes lasix). 30 tablet 2   • ramelteon (Rozerem) 8 MG tablet Take 0.5 tablets by mouth Every Night. 15 tablet 1   • traZODone (DESYREL) 100 MG tablet Take one  Half tablet PO Q HS, if insomnia not improved after 1 week, may take 1 tablet po Q HS. 30 tablet 1   • valACYclovir (VALTREX) 500 MG tablet Take 1 tablet by mouth 3 (Three) Times a Day As Needed (cold sore). 30 tablet 3   • vilazodone (Viibryd) 40 MG tablet tablet Take 1 tablet by mouth Daily. 30 tablet 4     No current facility-administered medications for this visit.        Review of Symptoms:    Review of Systems   Constitutional: Negative.    HENT: Negative.    Eyes: Negative.    Respiratory: Negative.    Cardiovascular: Negative.    Neurological: Negative.    Psychiatric/Behavioral: Positive for sleep disturbance and depressed mood. The patient is nervous/anxious.          Physical Exam:   There were no vitals taken for this visit.  There is no height or weight on file to calculate BMI.    Appearance: Cuacasian female of stated age, no acute distress   Gait, Station, Strength: Unable to assess    Mental Status Exam:   Hygiene:   good  Cooperation:  Cooperative  Eye Contact:  Good  Psychomotor Behavior:  Appropriate  Affect:  Appropriate  Mood: anxious  Hopelessness: Denies  Speech:  Normal  Thought Process:  Goal directed and Linear  Thought Content:  Normal  Suicidal:  None  Homicidal:  None  Hallucinations:  None  Delusion:  None  Memory:  Intact  Orientation:  Person, Place, Time and Situation  Reliability:  good  Insight:  Good  Judgement:  Good  Impulse Control:  Good  Physical/Medical Issues:  No      PHQ-Score Total:  PHQ-9 Total Score:        Lab Results:   No visits with results within 1 Month(s) from this visit.   Latest known visit with results is:   Office Visit on 09/23/2020   Component Date Value Ref  Range Status   • Glucose 09/23/2020 93  65 - 99 mg/dL Final   • BUN 09/23/2020 11  6 - 20 mg/dL Final   • Creatinine 09/23/2020 1.00  0.57 - 1.00 mg/dL Final   • Sodium 09/23/2020 140  136 - 145 mmol/L Final   • Potassium 09/23/2020 4.1  3.5 - 5.2 mmol/L Final   • Chloride 09/23/2020 102  98 - 107 mmol/L Final   • CO2 09/23/2020 24.2  22.0 - 29.0 mmol/L Final   • Calcium 09/23/2020 10.1  8.6 - 10.5 mg/dL Final   • Total Protein 09/23/2020 7.7  6.0 - 8.5 g/dL Final   • Albumin 09/23/2020 4.50  3.50 - 5.20 g/dL Final   • ALT (SGPT) 09/23/2020 21  1 - 33 U/L Final   • AST (SGOT) 09/23/2020 18  1 - 32 U/L Final   • Alkaline Phosphatase 09/23/2020 92  39 - 117 U/L Final   • Total Bilirubin 09/23/2020 0.4  0.0 - 1.2 mg/dL Final   • eGFR Non African Amer 09/23/2020 60* >60 mL/min/1.73 Final   • Globulin 09/23/2020 3.2  gm/dL Final   • A/G Ratio 09/23/2020 1.4  g/dL Final   • BUN/Creatinine Ratio 09/23/2020 11.0  7.0 - 25.0 Final   • Anion Gap 09/23/2020 13.8  5.0 - 15.0 mmol/L Final   • TSH 09/23/2020 0.766  0.270 - 4.200 uIU/mL Final   • Hemoglobin A1C 09/23/2020 5.55  4.80 - 5.60 % Final   • 25 Hydroxy, Vitamin D 09/23/2020 29.3* 30.0 - 100.0 ng/ml Final   • Vitamin B-12 09/23/2020 688  211 - 946 pg/mL Final   • Total Cholesterol 09/23/2020 229* 0 - 200 mg/dL Final   • Triglycerides 09/23/2020 111  0 - 150 mg/dL Final   • HDL Cholesterol 09/23/2020 54  40 - 60 mg/dL Final   • LDL Cholesterol  09/23/2020 153* 0 - 100 mg/dL Final   • VLDL Cholesterol 09/23/2020 22.2  5 - 40 mg/dL Final   • LDL/HDL Ratio 09/23/2020 2.83   Final   • FSH 09/23/2020 5.43  mIU/mL Final   • LH 09/23/2020 5.89  mIU/mL Final   • Estrogen 09/23/2020 172  pg/mL Final                                Prepubertal           <40                              Female Cycle:                                1-10 Days      61 - 394                               11-20 Days     122 - 437                               21-30 Days     156 - 350                                Post-Menopausal      <40                            [facility] Treatment for Ovulation                               Induction:     400 - 800   • Progesterone 09/23/2020 9.83  ng/mL Final   • C-Peptide 09/23/2020 2.5  1.1 - 4.4 ng/mL Final    C-Peptide reference interval is for fasting patients.   • Testosterone, Total 09/23/2020 32.30  8.40 - 48.10 ng/dL Final   • WBC 09/23/2020 7.14  3.40 - 10.80 10*3/mm3 Final   • RBC 09/23/2020 4.76  3.77 - 5.28 10*6/mm3 Final   • Hemoglobin 09/23/2020 13.6  12.0 - 15.9 g/dL Final   • Hematocrit 09/23/2020 40.5  34.0 - 46.6 % Final   • MCV 09/23/2020 85.1  79.0 - 97.0 fL Final   • MCH 09/23/2020 28.6  26.6 - 33.0 pg Final   • MCHC 09/23/2020 33.6  31.5 - 35.7 g/dL Final   • RDW 09/23/2020 13.8  12.3 - 15.4 % Final   • RDW-SD 09/23/2020 42.6  37.0 - 54.0 fl Final   • MPV 09/23/2020 11.5  6.0 - 12.0 fL Final   • Platelets 09/23/2020 249  140 - 450 10*3/mm3 Final   • Neutrophil % 09/23/2020 64.9  42.7 - 76.0 % Final   • Lymphocyte % 09/23/2020 28.7  19.6 - 45.3 % Final   • Monocyte % 09/23/2020 5.3  5.0 - 12.0 % Final   • Eosinophil % 09/23/2020 0.4  0.3 - 6.2 % Final   • Basophil % 09/23/2020 0.4  0.0 - 1.5 % Final   • Immature Grans % 09/23/2020 0.3  0.0 - 0.5 % Final   • Neutrophils, Absolute 09/23/2020 4.63  1.70 - 7.00 10*3/mm3 Final   • Lymphocytes, Absolute 09/23/2020 2.05  0.70 - 3.10 10*3/mm3 Final   • Monocytes, Absolute 09/23/2020 0.38  0.10 - 0.90 10*3/mm3 Final   • Eosinophils, Absolute 09/23/2020 0.03  0.00 - 0.40 10*3/mm3 Final   • Basophils, Absolute 09/23/2020 0.03  0.00 - 0.20 10*3/mm3 Final   • Immature Grans, Absolute 09/23/2020 0.02  0.00 - 0.05 10*3/mm3 Final   • nRBC 09/23/2020 0.0  0.0 - 0.2 /100 WBC Final       Assessment/Plan   Problems Addressed this Visit     None      Visit Diagnoses     Moderate episode of recurrent major depressive disorder (CMS/[facility])    -  Primary    Relevant Medications    traZODone (DESYREL) 100 MG tablet    Generalized anxiety  disorder        Relevant Medications    traZODone (DESYREL) 100 MG tablet    Insomnia secondary to depression with anxiety        Relevant Medications    traZODone (DESYREL) 100 MG tablet      Diagnoses       Codes Comments    Moderate episode of recurrent major depressive disorder (CMS/HCC)    -  Primary ICD-10-CM: F33.1  ICD-9-CM: 296.32     Generalized anxiety disorder     ICD-10-CM: F41.1  ICD-9-CM: 300.02     Insomnia secondary to depression with anxiety     ICD-10-CM: F51.05, F41.8  ICD-9-CM: 300.4, 327.02           -The benefits of a healthy diet and exercise were discussed with patient, especially the positive effects they have on mental health. Patient encouraged to consider lifestyle modification regarding  diet and exercise patterns to maximize results of mental health treatment.  -Reviewed previous available documentation  -Reviewed most recent available labs   -Continue the Viibryd 40 mg p.o. daily.  4 depression.    -Discontinue the Rozerem.  -Follow up in 2 months with Cynthia MUNIZ.  Visit Diagnoses:    ICD-10-CM ICD-9-CM   1. Moderate episode of recurrent major depressive disorder (CMS/HCC)  F33.1 296.32   2. Generalized anxiety disorder  F41.1 300.02   3. Insomnia secondary to depression with anxiety  F51.05 300.4    F41.8 327.02       TREATMENT PLAN/GOALS: Continue supportive psychotherapy efforts and medications as indicated. Treatment and medication options discussed during today's visit. Patient acknowledged and verbally consented to continue with current treatment plan and was educated on the importance of compliance with treatment and follow-up appointments.    MEDICATION ISSUES:    Discussed medication options and treatment plan of prescribed medication as well as the risks, benefits, and side effects including potential falls, possible impaired driving and metabolic adversities among others. Patient is agreeable to call the office with any worsening of symptoms or onset of side  effects. Patient is agreeable to call 911 or go to the nearest ER should he/she begin having SI/HI.     MEDS ORDERED DURING VISIT:  New Medications Ordered This Visit   Medications   • traZODone (DESYREL) 100 MG tablet     Sig: Take one  Half tablet PO Q HS, if insomnia not improved after 1 week, may take 1 tablet po Q HS.     Dispense:  30 tablet     Refill:  1       Return in about 2 months (around 3/8/2021) for Recheck.             This document has been electronically signed by FLAVIO Donnelly  January 8, 2021 08:46 EST    Part of this note may be an electronic transcription/translation of spoken language to printed text using the Dragon Dictation System.

## 2021-01-20 ENCOUNTER — OFFICE VISIT (OUTPATIENT)
Dept: FAMILY MEDICINE CLINIC | Facility: CLINIC | Age: 46
End: 2021-01-20

## 2021-01-20 VITALS — BODY MASS INDEX: 45.82 KG/M2 | HEIGHT: 62 IN | TEMPERATURE: 97.3 F | WEIGHT: 249 LBS

## 2021-01-20 DIAGNOSIS — F33.1 MODERATE EPISODE OF RECURRENT MAJOR DEPRESSIVE DISORDER (HCC): ICD-10-CM

## 2021-01-20 DIAGNOSIS — F41.1 GENERALIZED ANXIETY DISORDER: ICD-10-CM

## 2021-01-20 DIAGNOSIS — F51.05 INSOMNIA SECONDARY TO DEPRESSION WITH ANXIETY: ICD-10-CM

## 2021-01-20 DIAGNOSIS — F41.8 INSOMNIA SECONDARY TO DEPRESSION WITH ANXIETY: ICD-10-CM

## 2021-01-20 DIAGNOSIS — E66.01 MORBID (SEVERE) OBESITY DUE TO EXCESS CALORIES (HCC): Primary | ICD-10-CM

## 2021-01-20 DIAGNOSIS — I10 ESSENTIAL HYPERTENSION: ICD-10-CM

## 2021-01-20 PROCEDURE — 99214 OFFICE O/P EST MOD 30 MIN: CPT | Performed by: NURSE PRACTITIONER

## 2021-01-20 RX ORDER — VALACYCLOVIR HYDROCHLORIDE 500 MG/1
500 TABLET, FILM COATED ORAL 3 TIMES DAILY PRN
Qty: 30 TABLET | Refills: 3 | Status: SHIPPED | OUTPATIENT
Start: 2021-01-20

## 2021-01-20 RX ORDER — PHENTERMINE HYDROCHLORIDE 37.5 MG/1
37.5 TABLET ORAL
Qty: 30 TABLET | Refills: 0 | Status: SHIPPED | OUTPATIENT
Start: 2021-01-20 | End: 2021-03-02

## 2021-01-20 RX ORDER — IBUPROFEN 800 MG/1
800 TABLET ORAL 3 TIMES DAILY
Qty: 60 TABLET | Refills: 2 | Status: SHIPPED | OUTPATIENT
Start: 2021-01-20 | End: 2021-04-07 | Stop reason: SDUPTHER

## 2021-01-20 RX ORDER — TRAZODONE HYDROCHLORIDE 100 MG/1
TABLET ORAL
Qty: 30 TABLET | Refills: 1 | Status: SHIPPED | OUTPATIENT
Start: 2021-01-20 | End: 2021-03-22

## 2021-01-20 RX ORDER — FUROSEMIDE 20 MG/1
20 TABLET ORAL 2 TIMES DAILY
Qty: 60 TABLET | Refills: 3 | Status: SHIPPED | OUTPATIENT
Start: 2021-01-20 | End: 2021-04-07 | Stop reason: SDUPTHER

## 2021-01-20 RX ORDER — DOXEPIN HYDROCHLORIDE 25 MG/1
25 CAPSULE ORAL NIGHTLY
COMMUNITY
Start: 2020-11-07 | End: 2021-03-22

## 2021-01-20 RX ORDER — VILAZODONE HYDROCHLORIDE 40 MG/1
40 TABLET ORAL DAILY
Qty: 30 TABLET | Refills: 4 | Status: SHIPPED | OUTPATIENT
Start: 2021-01-20 | End: 2021-03-22 | Stop reason: SDUPTHER

## 2021-01-20 RX ORDER — POTASSIUM CHLORIDE 750 MG/1
10 TABLET, EXTENDED RELEASE ORAL 2 TIMES DAILY
Qty: 60 TABLET | Refills: 3 | Status: SHIPPED | OUTPATIENT
Start: 2021-01-20 | End: 2021-04-07 | Stop reason: SDUPTHER

## 2021-01-20 NOTE — PROGRESS NOTES
Subjective   Cynthia Kelsey is a 45 y.o. female.     No chief complaint on file.    CC    Obesity   Depression  Anxiety  Insomnia    History of Present Illness:    Requesting telehealth visit due to coronavirus pandemic.    Obesity-patient is exercising at least 4 days a week.  She is eating less than 1200 torin a day.  Trying to choose heart healthy choices.  Having difficulty losing weight.  Did take phentermine in the past with no side effects.  She denies any chest pain or shortness of breath.  Denies blood pressure problems.  Does monitor her heart rate and blood pressure occasionally.  Reports readings within normal.  Would like to discuss starting Adipex if appropriate.    Depression with insomnia/anxiety -patient currently receives doxepin, trazodone and Viibryd.  Reports her symptoms are stable.  Patient has been seen by behavioral health for major recurrent depression and generalized anxiety.  Denies thoughts of hurting self or others.  Denies any side effects.    The following portions of the patient's history and ROS were reviewed and updated as appropriate per provider:  Allergies, current medications, past family history, past medical history, past social history, past surgical history and problem list.    Review of Systems   Constitutional: Positive for unexpected weight change. Negative for activity change, appetite change, chills, fatigue and fever.   HENT: Negative for ear pain, facial swelling, hearing loss, sinus pressure, sore throat, trouble swallowing and voice change.    Eyes: Negative for pain, discharge and visual disturbance.   Respiratory: Negative for apnea, cough, chest tightness, shortness of breath and wheezing.    Cardiovascular: Negative for chest pain, palpitations and leg swelling.   Gastrointestinal: Negative for abdominal pain, blood in stool, constipation, diarrhea, nausea and vomiting.   Endocrine: Negative.    Genitourinary: Negative for difficulty urinating, dysuria and  "frequency.   Musculoskeletal: Positive for arthralgias. Negative for gait problem, myalgias, neck pain and neck stiffness.   Skin: Negative.  Negative for color change.   Allergic/Immunologic: Negative.    Neurological: Negative for dizziness, seizures and light-headedness.   Hematological: Negative.    Psychiatric/Behavioral: Negative for agitation, confusion, self-injury and suicidal ideas. The patient is not nervous/anxious.        Objective     Temp 97.3 °F (36.3 °C) (Tympanic)   Ht 157.5 cm (62\")   Wt 113 kg (249 lb)   LMP 01/15/2021   BMI 45.54 kg/m²   Office Visit on 09/23/2020   Component Date Value Ref Range Status   • Glucose 09/23/2020 93  65 - 99 mg/dL Final   • BUN 09/23/2020 11  6 - 20 mg/dL Final   • Creatinine 09/23/2020 1.00  0.57 - 1.00 mg/dL Final   • Sodium 09/23/2020 140  136 - 145 mmol/L Final   • Potassium 09/23/2020 4.1  3.5 - 5.2 mmol/L Final   • Chloride 09/23/2020 102  98 - 107 mmol/L Final   • CO2 09/23/2020 24.2  22.0 - 29.0 mmol/L Final   • Calcium 09/23/2020 10.1  8.6 - 10.5 mg/dL Final   • Total Protein 09/23/2020 7.7  6.0 - 8.5 g/dL Final   • Albumin 09/23/2020 4.50  3.50 - 5.20 g/dL Final   • ALT (SGPT) 09/23/2020 21  1 - 33 U/L Final   • AST (SGOT) 09/23/2020 18  1 - 32 U/L Final   • Alkaline Phosphatase 09/23/2020 92  39 - 117 U/L Final   • Total Bilirubin 09/23/2020 0.4  0.0 - 1.2 mg/dL Final   • eGFR Non African Amer 09/23/2020 60* >60 mL/min/1.73 Final   • Globulin 09/23/2020 3.2  gm/dL Final   • A/G Ratio 09/23/2020 1.4  g/dL Final   • BUN/Creatinine Ratio 09/23/2020 11.0  7.0 - 25.0 Final   • Anion Gap 09/23/2020 13.8  5.0 - 15.0 mmol/L Final   • TSH 09/23/2020 0.766  0.270 - 4.200 uIU/mL Final   • Hemoglobin A1C 09/23/2020 5.55  4.80 - 5.60 % Final   • 25 Hydroxy, Vitamin D 09/23/2020 29.3* 30.0 - 100.0 ng/ml Final   • Vitamin B-12 09/23/2020 688  211 - 946 pg/mL Final   • Total Cholesterol 09/23/2020 229* 0 - 200 mg/dL Final   • Triglycerides 09/23/2020 111  0 - 150 " mg/dL Final   • HDL Cholesterol 09/23/2020 54  40 - 60 mg/dL Final   • LDL Cholesterol  09/23/2020 153* 0 - 100 mg/dL Final   • VLDL Cholesterol 09/23/2020 22.2  5 - 40 mg/dL Final   • LDL/HDL Ratio 09/23/2020 2.83   Final   • FSH 09/23/2020 5.43  mIU/mL Final   • LH 09/23/2020 5.89  mIU/mL Final   • Estrogen 09/23/2020 172  pg/mL Final   • Progesterone 09/23/2020 9.83  ng/mL Final   • C-Peptide 09/23/2020 2.5  1.1 - 4.4 ng/mL Final   • Testosterone, Total 09/23/2020 32.30  8.40 - 48.10 ng/dL Final   • WBC 09/23/2020 7.14  3.40 - 10.80 10*3/mm3 Final   • RBC 09/23/2020 4.76  3.77 - 5.28 10*6/mm3 Final   • Hemoglobin 09/23/2020 13.6  12.0 - 15.9 g/dL Final   • Hematocrit 09/23/2020 40.5  34.0 - 46.6 % Final   • MCV 09/23/2020 85.1  79.0 - 97.0 fL Final   • MCH 09/23/2020 28.6  26.6 - 33.0 pg Final   • MCHC 09/23/2020 33.6  31.5 - 35.7 g/dL Final   • RDW 09/23/2020 13.8  12.3 - 15.4 % Final   • RDW-SD 09/23/2020 42.6  37.0 - 54.0 fl Final   • MPV 09/23/2020 11.5  6.0 - 12.0 fL Final   • Platelets 09/23/2020 249  140 - 450 10*3/mm3 Final   • Neutrophil % 09/23/2020 64.9  42.7 - 76.0 % Final   • Lymphocyte % 09/23/2020 28.7  19.6 - 45.3 % Final   • Monocyte % 09/23/2020 5.3  5.0 - 12.0 % Final   • Eosinophil % 09/23/2020 0.4  0.3 - 6.2 % Final   • Basophil % 09/23/2020 0.4  0.0 - 1.5 % Final   • Immature Grans % 09/23/2020 0.3  0.0 - 0.5 % Final   • Neutrophils, Absolute 09/23/2020 4.63  1.70 - 7.00 10*3/mm3 Final   • Lymphocytes, Absolute 09/23/2020 2.05  0.70 - 3.10 10*3/mm3 Final   • Monocytes, Absolute 09/23/2020 0.38  0.10 - 0.90 10*3/mm3 Final   • Eosinophils, Absolute 09/23/2020 0.03  0.00 - 0.40 10*3/mm3 Final   • Basophils, Absolute 09/23/2020 0.03  0.00 - 0.20 10*3/mm3 Final   • Immature Grans, Absolute 09/23/2020 0.02  0.00 - 0.05 10*3/mm3 Final   • nRBC 09/23/2020 0.0  0.0 - 0.2 /100 WBC Final       Physical Exam  Vitals signs reviewed.   Constitutional:       General: She is not in acute distress.      Appearance: Normal appearance. She is obese. She is not ill-appearing.   HENT:      Head: Normocephalic.      Right Ear: Hearing and external ear normal.      Left Ear: Hearing and external ear normal.      Nose: Nose normal.      Mouth/Throat:      Lips: Pink. No lesions.   Eyes:      General: Lids are normal. Vision grossly intact. Gaze aligned appropriately.   Pulmonary:      Effort: No accessory muscle usage, prolonged expiration or respiratory distress.   Musculoskeletal: Normal range of motion.   Skin:     Coloration: Skin is not cyanotic, jaundiced or pale.   Neurological:      Mental Status: She is alert and oriented to person, place, and time.   Psychiatric:         Attention and Perception: Attention normal.         Mood and Affect: Mood and affect normal.         Speech: Speech normal.         Behavior: Behavior normal. Behavior is cooperative.         Thought Content: Thought content normal.         Cognition and Memory: Cognition normal.         Judgment: Judgment normal.         Assessment/Plan     Problem List Items Addressed This Visit     None      Visit Diagnoses     Morbid (severe) obesity due to excess calories (CMS/HCC)    -  Primary    Relevant Medications    phentermine (Adipex-P) 37.5 MG tablet    Moderate episode of recurrent major depressive disorder (CMS/HCC)        Relevant Medications    doxepin (SINEquan) 25 MG capsule    traZODone (DESYREL) 100 MG tablet    vilazodone (Viibryd) 40 MG tablet tablet    potassium chloride (K-DUR,KLOR-CON) 10 MEQ CR tablet    phentermine (Adipex-P) 37.5 MG tablet    Generalized anxiety disorder        Relevant Medications    doxepin (SINEquan) 25 MG capsule    traZODone (DESYREL) 100 MG tablet    vilazodone (Viibryd) 40 MG tablet tablet    phentermine (Adipex-P) 37.5 MG tablet    Insomnia secondary to depression with anxiety        Relevant Medications    doxepin (SINEquan) 25 MG capsule    traZODone (DESYREL) 100 MG tablet    vilazodone (Viibryd) 40 MG  tablet tablet    phentermine (Adipex-P) 37.5 MG tablet    Essential hypertension        Relevant Medications    furosemide (LASIX) 20 MG tablet    potassium chloride (K-DUR,KLOR-CON) 10 MEQ CR tablet        I have reviewed medication list and discussed with patient the possible side effects and interactions.  We have discussed purpose for medication, route, dosage, frequency.  Refill routine medications.    Pt has been instructed today regarding low fat heart smart diet. Weight management and routine exercise has been recommended. Avoid high fat foods, starchy foods and processed foods. Increase lean meats, fresh vegetables and fresh fruits.     Instructed on 7716-7979 calorie daily diet with low fat and low carb choices. Heart healthy diet. Goal is weight loss of five pounds each month and three pounds each additional month with no side effects. Will evaluate monthly. Instructed on possible side effects and reasons to stop medication as well as reasons for emergent care.     Start adipex 37.5 mg daily .  Reviewed possible side effects, interactions and reasons to stop this medication.  [place]/PDMP has been reviewed as consistent.  Pt is at low risk for substance abuse or addiction. Pt will be monitored closely for compliance and the need to continue plan of care.       Patient's Body mass index is 45.54 kg/m². BMI is above normal parameters. Recommendations include: exercise counseling, nutrition counseling and pharmacological intervention.           Coronavirus precautions have been reviewed and discussed.  I have discussed the CDC recommendations  of social distancing, hand washing, wearing mask and disinfecting commonly touched items. Reviewed need to notify PCP and self quarantine with mild symptoms.  Discussed procedure to obtain Covid-19 testing and notification of PCP/health dept/ED/Urgent Center if symptoms begin. Understanding verbalized.    I have discussed diagnosis in detail today allowing time for  questions and answers. Patient is aware of reasons to seek urgent or emergent medical care as well as reasons to return to the clinic for evaluation. Possible side effects, interactions and progression of symptoms discussed as well. Patient / family states understanding.   Emotional support and active listening provided.       This was an audio and video enabled telemedicine encounter.  Approximate time spent on video encounter 15 minutes. 15 minutes spent reviewing chart and consult notes.         This document has been electronically signed by:  FLAVIO Daly, NP-C

## 2021-02-24 DIAGNOSIS — E66.01 MORBID (SEVERE) OBESITY DUE TO EXCESS CALORIES (HCC): ICD-10-CM

## 2021-03-01 RX ORDER — PHENTERMINE HYDROCHLORIDE 37.5 MG/1
37.5 TABLET ORAL
Qty: 30 TABLET | Refills: 0 | OUTPATIENT
Start: 2021-03-01

## 2021-03-02 ENCOUNTER — TELEMEDICINE (OUTPATIENT)
Dept: FAMILY MEDICINE CLINIC | Facility: CLINIC | Age: 46
End: 2021-03-02

## 2021-03-02 VITALS
HEIGHT: 62 IN | SYSTOLIC BLOOD PRESSURE: 118 MMHG | TEMPERATURE: 98.6 F | DIASTOLIC BLOOD PRESSURE: 71 MMHG | RESPIRATION RATE: 20 BRPM | WEIGHT: 244 LBS | BODY MASS INDEX: 44.9 KG/M2

## 2021-03-02 DIAGNOSIS — F32.9 MAJOR DEPRESSIVE DISORDER WITH CURRENT ACTIVE EPISODE, UNSPECIFIED DEPRESSION EPISODE SEVERITY, UNSPECIFIED WHETHER RECURRENT: ICD-10-CM

## 2021-03-02 DIAGNOSIS — F41.9 ANXIETY: Primary | ICD-10-CM

## 2021-03-02 DIAGNOSIS — E66.01 CLASS 2 SEVERE OBESITY DUE TO EXCESS CALORIES WITH SERIOUS COMORBIDITY IN ADULT, UNSPECIFIED BMI (HCC): ICD-10-CM

## 2021-03-02 PROCEDURE — 99213 OFFICE O/P EST LOW 20 MIN: CPT | Performed by: NURSE PRACTITIONER

## 2021-03-02 NOTE — PROGRESS NOTES
Subjective   Cynthia Kelsey is a 45 y.o. female.     No chief complaint on file.  CC  Depression, anxiety and insomnia    History of Present Illness:    Depressed -ports she currently takes Viibryd.  Has been under the care of psychiatric nurse practitioner for some time.  No recent visit due to providers maternity leave.  Patient states she does not feel that her depression is controlled.  She cries and has frequent mood swings.  She feels that her Viibryd is causing vivid dreams.  Denies thoughts of hurting self or others.    Anxiety -anxious all the time and nervous.  Does not feel that Adipex made her any more anxious or increased her anxiety.    Insomnia -patient reports that she is not sleeping very well.  Currently receives doxepin, trazodone and Viibryd.  Can fall asleep but has difficulty staying asleep.    Obesity-weight 244 pounds with BMI greater than 44.  Some success on Adipex.  Felt Adipex gave her some energy and decreased her appetite.  Patient states she has been having great mood swings just prior to her menstrual cycle.  Seen by her GYN today and told she does not feel it is due to her menstrual cycle.      The following portions of the patient's history and ROS were reviewed and updated as appropriate per provider:  Allergies, current medications, past family history, past medical history, past social history, past surgical history and problem list.    Review of Systems   Constitutional: Positive for activity change, appetite change and fatigue. Negative for chills and fever.   HENT: Negative for congestion, ear pain, facial swelling, hearing loss, sinus pressure, sinus pain, sore throat, trouble swallowing and voice change.    Eyes: Negative for pain, discharge and visual disturbance.   Respiratory: Negative for apnea, cough, chest tightness, shortness of breath and wheezing.    Cardiovascular: Negative for chest pain, palpitations and leg swelling.   Gastrointestinal: Negative for abdominal  "pain, blood in stool, constipation, diarrhea, nausea and vomiting.   Endocrine: Negative.    Genitourinary: Negative for difficulty urinating, dysuria and urgency.   Musculoskeletal: Positive for arthralgias. Negative for neck stiffness.   Skin: Negative.  Negative for color change.   Allergic/Immunologic: Negative.    Neurological: Negative for facial asymmetry, speech difficulty and headaches.   Hematological: Negative.    Psychiatric/Behavioral: Positive for dysphoric mood and sleep disturbance. Negative for agitation, confusion, self-injury and suicidal ideas. The patient is not nervous/anxious.        Objective     /71   Temp 98.6 °F (37 °C)   Resp 20   Ht 157.5 cm (62\")   Wt 111 kg (244 lb)   BMI 44.63 kg/m²   Office Visit on 09/23/2020   Component Date Value Ref Range Status   • Glucose 09/23/2020 93  65 - 99 mg/dL Final   • BUN 09/23/2020 11  6 - 20 mg/dL Final   • Creatinine 09/23/2020 1.00  0.57 - 1.00 mg/dL Final   • Sodium 09/23/2020 140  136 - 145 mmol/L Final   • Potassium 09/23/2020 4.1  3.5 - 5.2 mmol/L Final   • Chloride 09/23/2020 102  98 - 107 mmol/L Final   • CO2 09/23/2020 24.2  22.0 - 29.0 mmol/L Final   • Calcium 09/23/2020 10.1  8.6 - 10.5 mg/dL Final   • Total Protein 09/23/2020 7.7  6.0 - 8.5 g/dL Final   • Albumin 09/23/2020 4.50  3.50 - 5.20 g/dL Final   • ALT (SGPT) 09/23/2020 21  1 - 33 U/L Final   • AST (SGOT) 09/23/2020 18  1 - 32 U/L Final   • Alkaline Phosphatase 09/23/2020 92  39 - 117 U/L Final   • Total Bilirubin 09/23/2020 0.4  0.0 - 1.2 mg/dL Final   • eGFR Non African Amer 09/23/2020 60* >60 mL/min/1.73 Final   • Globulin 09/23/2020 3.2  gm/dL Final   • A/G Ratio 09/23/2020 1.4  g/dL Final   • BUN/Creatinine Ratio 09/23/2020 11.0  7.0 - 25.0 Final   • Anion Gap 09/23/2020 13.8  5.0 - 15.0 mmol/L Final   • TSH 09/23/2020 0.766  0.270 - 4.200 uIU/mL Final   • Hemoglobin A1C 09/23/2020 5.55  4.80 - 5.60 % Final   • 25 Hydroxy, Vitamin D 09/23/2020 29.3* 30.0 - 100.0 " ng/ml Final   • Vitamin B-12 09/23/2020 688  211 - 946 pg/mL Final   • Total Cholesterol 09/23/2020 229* 0 - 200 mg/dL Final   • Triglycerides 09/23/2020 111  0 - 150 mg/dL Final   • HDL Cholesterol 09/23/2020 54  40 - 60 mg/dL Final   • LDL Cholesterol  09/23/2020 153* 0 - 100 mg/dL Final   • VLDL Cholesterol 09/23/2020 22.2  5 - 40 mg/dL Final   • LDL/HDL Ratio 09/23/2020 2.83   Final   • FSH 09/23/2020 5.43  mIU/mL Final   • LH 09/23/2020 5.89  mIU/mL Final   • Estrogen 09/23/2020 172  pg/mL Final   • Progesterone 09/23/2020 9.83  ng/mL Final   • C-Peptide 09/23/2020 2.5  1.1 - 4.4 ng/mL Final   • Testosterone, Total 09/23/2020 32.30  8.40 - 48.10 ng/dL Final   • WBC 09/23/2020 7.14  3.40 - 10.80 10*3/mm3 Final   • RBC 09/23/2020 4.76  3.77 - 5.28 10*6/mm3 Final   • Hemoglobin 09/23/2020 13.6  12.0 - 15.9 g/dL Final   • Hematocrit 09/23/2020 40.5  34.0 - 46.6 % Final   • MCV 09/23/2020 85.1  79.0 - 97.0 fL Final   • MCH 09/23/2020 28.6  26.6 - 33.0 pg Final   • MCHC 09/23/2020 33.6  31.5 - 35.7 g/dL Final   • RDW 09/23/2020 13.8  12.3 - 15.4 % Final   • RDW-SD 09/23/2020 42.6  37.0 - 54.0 fl Final   • MPV 09/23/2020 11.5  6.0 - 12.0 fL Final   • Platelets 09/23/2020 249  140 - 450 10*3/mm3 Final   • Neutrophil % 09/23/2020 64.9  42.7 - 76.0 % Final   • Lymphocyte % 09/23/2020 28.7  19.6 - 45.3 % Final   • Monocyte % 09/23/2020 5.3  5.0 - 12.0 % Final   • Eosinophil % 09/23/2020 0.4  0.3 - 6.2 % Final   • Basophil % 09/23/2020 0.4  0.0 - 1.5 % Final   • Immature Grans % 09/23/2020 0.3  0.0 - 0.5 % Final   • Neutrophils, Absolute 09/23/2020 4.63  1.70 - 7.00 10*3/mm3 Final   • Lymphocytes, Absolute 09/23/2020 2.05  0.70 - 3.10 10*3/mm3 Final   • Monocytes, Absolute 09/23/2020 0.38  0.10 - 0.90 10*3/mm3 Final   • Eosinophils, Absolute 09/23/2020 0.03  0.00 - 0.40 10*3/mm3 Final   • Basophils, Absolute 09/23/2020 0.03  0.00 - 0.20 10*3/mm3 Final   • Immature Grans, Absolute 09/23/2020 0.02  0.00 - 0.05 10*3/mm3 Final    • [facility] 09/23/2020 0.0  0.0 - 0.2 /100 WBC Final       Physical Exam  Vitals signs reviewed.   Constitutional:       Appearance: Normal appearance. She is obese. She is not ill-appearing.   HENT:      Head: Atraumatic.      Right Ear: Hearing and external ear normal.      Left Ear: Hearing and external ear normal.      Nose:      Comments: External nose normal      Mouth/Throat:      Lips: Pink. No lesions.   Eyes:      General: Lids are normal. Vision grossly intact. Gaze aligned appropriately.   Neck:      Musculoskeletal: Normal range of motion.   Pulmonary:      Effort: No accessory muscle usage or respiratory distress.   Musculoskeletal: Normal range of motion.   Skin:     Coloration: Skin is not cyanotic or pale.      Nails: There is no clubbing.     Neurological:      Mental Status: She is alert and oriented to person, place, and time.   Psychiatric:         Attention and Perception: Attention normal.         Mood and Affect: Mood is depressed. Affect is tearful.         Speech: Speech normal.         Behavior: Behavior normal. Behavior is cooperative.         Thought Content: Thought content normal.         Cognition and Memory: Cognition and memory normal.         Assessment/Plan     Problem List Items Addressed This Visit        Endocrine and Metabolic    Class 2 severe obesity due to excess calories with serious comorbidity in adult (CMS/[facility])    Current Assessment & Plan     Patient's (Body mass index is 44.63 kg/m².) indicates that they are morbidly obese (BMI > 40 or > 35 with obesity - related health condition) with obesity-related health conditions that include Depression . Obesity is unchanged. BMI is is above average; BMI management plan is completed. We discussed low calorie, low carb based diet program, portion control and increasing exercise.     We will hold Adipex at this time and consider starting back after she sees her psychiatric nurse practitioner for medication evaluation and adjustment.   I do want her to work on heart healthy diet.  I do feel part of her depression is her obesity as well that her obesity is partially related to her depression.         Relevant Orders    CBC & Differential    Comprehensive Metabolic Panel    TSH    Hemoglobin A1c    Vitamin D 25 Hydroxy    Vitamin B12    Lipid Panel       Mental Health    Anxiety - Primary    Current Assessment & Plan     Appointment has been scheduled with psychiatric nurse practitioner for medication adjustment and evaluation.  Emotional support provided.  Patient denies thoughts of hurting self or others.         Relevant Orders    CBC & Differential    Comprehensive Metabolic Panel    TSH    Hemoglobin A1c    Vitamin D 25 Hydroxy    Vitamin B12    Lipid Panel    Major depressive disorder with current active episode    Current Assessment & Plan     Patient's depression is recurrent.  She is under the care of psychiatry.  Has no scheduled with psychiatric nurse practitioner for further question medication adjustment.  Continue current medications at this time.         Relevant Orders    CBC & Differential    Comprehensive Metabolic Panel    TSH    Hemoglobin A1c    Vitamin D 25 Hydroxy    Vitamin B12    Lipid Panel          I have reviewed medication list and discussed with patient the possible side effects and interactions.  We have discussed purpose for medication, route, dosage, frequency.  Refill routine medications.    Coronavirus precautions have been reviewed and discussed.  I have discussed the CDC recommendations  of social distancing, hand washing and disinfecting commonly touched items. Reviewed need to notify PCP and self quarantine with mild symptoms.  Discussed procedure to obtain Covid-19 testing and notification of PCP/health dept/ED/Urgent Center if symptoms begin. Understanding verbalized.             Patient's Body mass index is 44.63 kg/m². BMI is above normal parameters. Recommendations include: exercise counseling and  nutrition counseling.    Pt has been instructed today regarding low fat heart smart diet. Weight management and routine exercise has been recommended. Avoid high fat foods, starchy foods and processed foods. Increase lean meats, fresh vegetables and fresh fruits.       I have discussed diagnosis in detail today allowing time for questions and answers. Patient is aware of reasons to seek urgent or emergent medical care as well as reasons to return to the clinic for evaluation. Possible side effects, interactions and progression of symptoms discussed as well. Patient / family states understanding.   Emotional support and active listening provided.     Follow-up with me in 2-4 weeks, sooner if needed.  We will plan on fasting labs during that visit.    This was an audio and video enabled telemedicine encounter.  Using doxy nallely. 22 minutes face to face .       This document has been electronically signed by:  FLAVIO Daly, NP-C

## 2021-03-02 NOTE — ASSESSMENT & PLAN NOTE
Patient's (Body mass index is 44.63 kg/m².) indicates that they are morbidly obese (BMI > 40 or > 35 with obesity - related health condition) with obesity-related health conditions that include Depression . Obesity is unchanged. BMI is is above average; BMI management plan is completed. We discussed low calorie, low carb based diet program, portion control and increasing exercise.     We will hold Adipex at this time and consider starting back after she sees her psychiatric nurse practitioner for medication evaluation and adjustment.  I do want her to work on heart healthy diet.  I do feel part of her depression is her obesity as well that her obesity is partially related to her depression.

## 2021-03-02 NOTE — ASSESSMENT & PLAN NOTE
Appointment has been scheduled with psychiatric nurse practitioner for medication adjustment and evaluation.  Emotional support provided.  Patient denies thoughts of hurting self or others.

## 2021-03-02 NOTE — ASSESSMENT & PLAN NOTE
Patient's depression is recurrent.  She is under the care of psychiatry.  Has no scheduled with psychiatric nurse practitioner for further question medication adjustment.  Continue current medications at this time.

## 2021-03-18 ENCOUNTER — BULK ORDERING (OUTPATIENT)
Dept: CASE MANAGEMENT | Facility: OTHER | Age: 46
End: 2021-03-18

## 2021-03-18 ENCOUNTER — IMMUNIZATION (OUTPATIENT)
Dept: VACCINE CLINIC | Facility: HOSPITAL | Age: 46
End: 2021-03-18

## 2021-03-18 DIAGNOSIS — Z23 IMMUNIZATION DUE: ICD-10-CM

## 2021-03-18 PROCEDURE — 91300 HC SARSCOV02 VAC 30MCG/0.3ML IM: CPT | Performed by: INTERNAL MEDICINE

## 2021-03-18 PROCEDURE — 0001A: CPT | Performed by: INTERNAL MEDICINE

## 2021-03-22 ENCOUNTER — TELEMEDICINE (OUTPATIENT)
Dept: PSYCHIATRY | Facility: CLINIC | Age: 46
End: 2021-03-22

## 2021-03-22 DIAGNOSIS — F41.1 GENERALIZED ANXIETY DISORDER: ICD-10-CM

## 2021-03-22 DIAGNOSIS — F33.1 MODERATE EPISODE OF RECURRENT MAJOR DEPRESSIVE DISORDER (HCC): Primary | ICD-10-CM

## 2021-03-22 PROCEDURE — 99213 OFFICE O/P EST LOW 20 MIN: CPT | Performed by: NURSE PRACTITIONER

## 2021-03-22 RX ORDER — VILAZODONE HYDROCHLORIDE 40 MG/1
40 TABLET ORAL DAILY
Qty: 30 TABLET | Refills: 4 | Status: SHIPPED | OUTPATIENT
Start: 2021-03-22 | End: 2021-11-11

## 2021-03-22 RX ORDER — QUETIAPINE FUMARATE 25 MG/1
12.5-25 TABLET, FILM COATED ORAL NIGHTLY
Qty: 30 TABLET | Refills: 0 | Status: SHIPPED | OUTPATIENT
Start: 2021-03-22 | End: 2021-04-27 | Stop reason: SDUPTHER

## 2021-04-07 DIAGNOSIS — I10 ESSENTIAL HYPERTENSION: ICD-10-CM

## 2021-04-07 DIAGNOSIS — F33.1 MODERATE EPISODE OF RECURRENT MAJOR DEPRESSIVE DISORDER (HCC): ICD-10-CM

## 2021-04-08 RX ORDER — IBUPROFEN 800 MG/1
800 TABLET ORAL 3 TIMES DAILY
Qty: 60 TABLET | Refills: 2 | Status: SHIPPED | OUTPATIENT
Start: 2021-04-08

## 2021-04-08 RX ORDER — FUROSEMIDE 20 MG/1
20 TABLET ORAL 2 TIMES DAILY
Qty: 60 TABLET | Refills: 3 | Status: SHIPPED | OUTPATIENT
Start: 2021-04-08

## 2021-04-08 RX ORDER — POTASSIUM CHLORIDE 750 MG/1
10 TABLET, EXTENDED RELEASE ORAL 2 TIMES DAILY
Qty: 60 TABLET | Refills: 3 | Status: SHIPPED | OUTPATIENT
Start: 2021-04-08

## 2021-04-14 ENCOUNTER — IMMUNIZATION (OUTPATIENT)
Dept: VACCINE CLINIC | Facility: HOSPITAL | Age: 46
End: 2021-04-14

## 2021-04-14 PROCEDURE — 91300 HC SARSCOV02 VAC 30MCG/0.3ML IM: CPT | Performed by: INTERNAL MEDICINE

## 2021-04-14 PROCEDURE — 0002A: CPT | Performed by: INTERNAL MEDICINE

## 2021-04-27 DIAGNOSIS — F33.1 MODERATE EPISODE OF RECURRENT MAJOR DEPRESSIVE DISORDER (HCC): ICD-10-CM

## 2021-04-27 RX ORDER — QUETIAPINE FUMARATE 25 MG/1
12.5-25 TABLET, FILM COATED ORAL NIGHTLY
Qty: 30 TABLET | Refills: 0 | Status: SHIPPED | OUTPATIENT
Start: 2021-04-27

## 2021-11-10 DIAGNOSIS — F41.1 GENERALIZED ANXIETY DISORDER: ICD-10-CM

## 2021-11-10 DIAGNOSIS — F33.1 MODERATE EPISODE OF RECURRENT MAJOR DEPRESSIVE DISORDER (HCC): ICD-10-CM

## 2021-11-11 RX ORDER — VILAZODONE HYDROCHLORIDE 40 MG/1
TABLET ORAL
Qty: 90 TABLET | Refills: 0 | Status: SHIPPED | OUTPATIENT
Start: 2021-11-11

## 2021-11-12 ENCOUNTER — TRANSCRIBE ORDERS (OUTPATIENT)
Dept: ADMINISTRATIVE | Facility: HOSPITAL | Age: 46
End: 2021-11-12

## 2021-11-12 DIAGNOSIS — R68.89 ABNORMAL NECK FINDING: Primary | ICD-10-CM

## 2021-11-16 ENCOUNTER — HOSPITAL ENCOUNTER (OUTPATIENT)
Dept: ULTRASOUND IMAGING | Facility: HOSPITAL | Age: 46
End: 2021-11-16

## 2021-11-16 ENCOUNTER — APPOINTMENT (OUTPATIENT)
Dept: ULTRASOUND IMAGING | Facility: HOSPITAL | Age: 46
End: 2021-11-16

## 2023-12-14 ENCOUNTER — HOSPITAL ENCOUNTER (OUTPATIENT)
Dept: ULTRASOUND IMAGING | Facility: HOSPITAL | Age: 48
Discharge: HOME | End: 2023-12-14
Payer: COMMERCIAL

## 2023-12-14 ENCOUNTER — HOSPITAL ENCOUNTER (OUTPATIENT)
Dept: MAMMOGRAPHY | Facility: HOSPITAL | Age: 48
Discharge: HOME | End: 2023-12-14
Payer: COMMERCIAL

## 2023-12-14 DIAGNOSIS — R92.8 ABNORMAL MAMMOGRAM: ICD-10-CM

## 2023-12-14 PROCEDURE — G0279 TOMOSYNTHESIS, MAMMO: HCPCS

## 2023-12-14 PROCEDURE — 77065 DX MAMMO INCL CAD UNI: CPT

## 2023-12-14 PROCEDURE — 76642 ULTRASOUND BREAST LIMITED: CPT

## 2025-05-19 NOTE — PROGRESS NOTES
Subjective   Cynthia Kelsey is a 44 y.o. female is here today for medication management follow-up.    Chief Complaint:  Depression anxiety anger    History of Present Illness:    Patient presents today for follow up for medication management for depression, anger,  and anxiety. Patient states she tried to do things on her own but is struggling and missing work. Patient states the past month she hasn't worked a full week due to feeling depressed. Patient states a lot of down feeling and anger feeling. Patient states currently looking for another job due to feeling like her current job is the biggest issue with her mood and stress. Patient states don't care about anything anymore.  Patient reports currently depression is at a 10 on a 0-10 scale with 10 being the worst. Patient reports symptoms of depression of decreased energy, anger, depressed mood, crying spells, and overwhelmed feeling. Patient states doing exercises and things at home with daughter and that has helped. Patient reports anxiety is at a 5-6 on a 0-10 scale with 10 being the worst. Patient denies any panic attacks. Patient states taking hours to fall asleep and still waking up a few times throughout the night. Patient denies ay nightmares. Patient reports appetite is good and eating at least 2-3 meals a day. Patient denies any visual hallucinations, but still hearing things occasionally as she was before. Patient adamantly denies any SI or HI. Patient denies any medical changes since last visit. Patient denies any issues or side effects from stopping the Abilify and Prozac.   The following portions of the patient's history were reviewed and updated as appropriate: allergies, current medications, past family history, past medical history, past social history, past surgical history and problem list.    Review of Systems   Constitutional: Positive for fatigue.   Respiratory: Negative.    Cardiovascular: Negative.    Gastrointestinal: Negative.       Neurological: Negative.    Psychiatric/Behavioral: Positive for agitation, dysphoric mood and sleep disturbance. The patient is nervous/anxious.        Objective   Physical Exam   Constitutional: Vital signs are normal. She appears well-developed and well-nourished. She is cooperative.   Neurological: She is alert.   Psychiatric: Her speech is normal and behavior is normal. Judgment and thought content normal. Cognition and memory are normal. She exhibits a depressed mood.   Vitals reviewed.    Blood pressure 140/76, pulse 102, temperature 97.3 °F (36.3 °C), weight 115 kg (253 lb). Body mass index is 44.83 kg/m².    No Known Allergies    Medication List:   Current Outpatient Medications   Medication Sig Dispense Refill   • furosemide (LASIX) 20 MG tablet Take 20 mg by mouth Daily.     • ibuprofen (ADVIL,MOTRIN) 800 MG tablet Take 800 mg by mouth 3 (Three) Times a Day.  2   • pantoprazole (PROTONIX) 40 MG EC tablet Take 40 mg by mouth Daily.  2   • valACYclovir (VALTREX) 500 MG tablet   3   • FLUZONE QUADRIVALENT 0.5 ML suspension prefilled syringe injection PHARMACIST ADMINISTERED IMMUNIZATION ADMINISTERED AT TIME OF DISPENSING  0   • hydrochlorothiazide (HYDRODIURIL) 25 MG tablet Take 25 mg by mouth Daily.  0   • M-M-R II injection PHARMACIST ADMINISTERED IMMUNIZATION ADMINISTERED AT TIME OF DISPENSING  0   • TWINRIX 720-20 ELU-MCG/ML injection PHARMACIST ADMINISTERED IMMUNIZATION ADMINISTERED AT TIME OF DISPENSING  0   • Vilazodone HCl (Viibryd Starter Pack) 10 & 20 MG kit Take 10 mg by mouth Daily. 1 kit 0     No current facility-administered medications for this visit.        Mental Status Exam:   Hygiene:   good  Cooperation:  Cooperative  Eye Contact:  Good  Psychomotor Behavior:  Appropriate  Affect:  Appropriate  Hopelessness: Denies  Speech:  Normal  Thought Process:  Goal directed and Linear  Thought Content:  Normal  Suicidal:  None  Homicidal:  None  Hallucinations:  None  Delusion:  None  Memory:   Intact  Orientation:  Person, Place, Time and Situation  Reliability:  fair  Insight:  Fair  Judgement:  Fair  Impulse Control:  Fair  Physical/Medical Issues:  No               Assessment/Plan   Diagnoses and all orders for this visit:    Major depressive disorder, recurrent episode, severe, with psychosis (CMS/HCC)  -     Vilazodone HCl (Viibryd Starter Pack) 10 & 20 MG kit; Take 10 mg by mouth Daily.            Discussed medication options with patient. Carrie. Prozac and Abilify. Start Viibryd 10mg/20mg starter pack daily for worsening depression.  Reviewed the risks, benefits, and side effects of the medications; patient acknowledged and verbally consented.  Patient is agreeable to call the office with any questions, concerns, or worsening of symptoms. Patient is aware to call 911 or go to the nearest ER should begin having SI/HI.          Follow up in two weeks      Errors in dictation may reflect use of voice recognition software and not all errors in transcription may have been detected prior to signing.              This document has been electronically signed by FLAVIO Georges   May 6, 2020 11:20   Plan: Recommend SPF 30+ broad spectrum, reapply every 2 hours or after sweating/swimming Detail Level: Zone